# Patient Record
Sex: FEMALE | Race: WHITE | Employment: OTHER | URBAN - METROPOLITAN AREA
[De-identification: names, ages, dates, MRNs, and addresses within clinical notes are randomized per-mention and may not be internally consistent; named-entity substitution may affect disease eponyms.]

---

## 2016-07-11 LAB
ANTIBODY: NORMAL
HIV AG/AB: NORMAL

## 2017-03-20 ENCOUNTER — OFFICE VISIT (OUTPATIENT)
Dept: ORTHOPEDIC SURGERY | Age: 52
End: 2017-03-20

## 2017-03-20 VITALS — BODY MASS INDEX: 27.85 KG/M2 | WEIGHT: 163.14 LBS | HEIGHT: 64 IN

## 2017-03-20 DIAGNOSIS — M51.36 LUMBAR DEGENERATIVE DISC DISEASE: Primary | ICD-10-CM

## 2017-03-20 PROCEDURE — 99243 OFF/OP CNSLTJ NEW/EST LOW 30: CPT | Performed by: ORTHOPAEDIC SURGERY

## 2017-03-20 RX ORDER — PROMETHAZINE HYDROCHLORIDE 25 MG/1
25 TABLET ORAL
COMMUNITY
Start: 2016-10-19 | End: 2017-07-20

## 2017-03-20 RX ORDER — DIAZEPAM 5 MG/1
5 TABLET ORAL
COMMUNITY
Start: 2017-03-01 | End: 2017-07-20

## 2017-03-20 RX ORDER — TIZANIDINE 4 MG/1
2-12 TABLET ORAL
COMMUNITY
Start: 2017-03-01 | End: 2017-07-20

## 2017-03-21 ENCOUNTER — TELEPHONE (OUTPATIENT)
Dept: ORTHOPEDIC SURGERY | Age: 52
End: 2017-03-21

## 2017-04-20 ENCOUNTER — HOSPITAL ENCOUNTER (OUTPATIENT)
Dept: PAIN MANAGEMENT | Age: 52
Discharge: HOME | End: 2017-04-20
Attending: PAIN MEDICINE | Admitting: PAIN MEDICINE

## 2017-04-20 VITALS
HEIGHT: 64 IN | DIASTOLIC BLOOD PRESSURE: 79 MMHG | HEART RATE: 78 BPM | BODY MASS INDEX: 31.76 KG/M2 | TEMPERATURE: 97 F | SYSTOLIC BLOOD PRESSURE: 110 MMHG | OXYGEN SATURATION: 100 % | RESPIRATION RATE: 16 BRPM | WEIGHT: 186 LBS

## 2017-04-20 PROCEDURE — 64484 NJX AA&/STRD TFRM EPI L/S EA: CPT | Performed by: PAIN MEDICINE

## 2017-04-20 PROCEDURE — 64483 NJX AA&/STRD TFRM EPI L/S 1: CPT | Performed by: PAIN MEDICINE

## 2017-04-20 RX ORDER — GABAPENTIN 600 MG/1
600 TABLET ORAL 3 TIMES DAILY
COMMUNITY
End: 2018-08-26

## 2017-04-20 ASSESSMENT — ACTIVITIES OF DAILY LIVING (ADL): EFFECT OF PAIN ON DAILY ACTIVITIES: SLEEPING

## 2017-04-20 ASSESSMENT — PAIN DESCRIPTION - DESCRIPTORS: DESCRIPTORS: ACHING;SHARP;CONSTANT

## 2017-04-20 ASSESSMENT — PAIN - FUNCTIONAL ASSESSMENT: PAIN_FUNCTIONAL_ASSESSMENT: 0-10

## 2017-06-22 ENCOUNTER — HOSPITAL ENCOUNTER (OUTPATIENT)
Dept: PAIN MANAGEMENT | Age: 52
Discharge: HOME | End: 2017-06-22
Attending: PAIN MEDICINE | Admitting: PAIN MEDICINE

## 2017-06-22 VITALS
RESPIRATION RATE: 18 BRPM | OXYGEN SATURATION: 98 % | HEIGHT: 64 IN | DIASTOLIC BLOOD PRESSURE: 81 MMHG | HEART RATE: 80 BPM | WEIGHT: 182 LBS | TEMPERATURE: 97 F | BODY MASS INDEX: 31.07 KG/M2 | SYSTOLIC BLOOD PRESSURE: 127 MMHG

## 2017-06-22 PROCEDURE — 64494 INJ PARAVERT F JNT L/S 2 LEV: CPT | Performed by: PAIN MEDICINE

## 2017-06-22 PROCEDURE — 64493 INJ PARAVERT F JNT L/S 1 LEV: CPT | Performed by: PAIN MEDICINE

## 2017-06-22 ASSESSMENT — PAIN - FUNCTIONAL ASSESSMENT
PAIN_FUNCTIONAL_ASSESSMENT: 0-10
PAIN_FUNCTIONAL_ASSESSMENT: 0-10

## 2017-06-22 ASSESSMENT — PAIN DESCRIPTION - DESCRIPTORS: DESCRIPTORS: SHARP

## 2017-07-20 ENCOUNTER — HOSPITAL ENCOUNTER (OUTPATIENT)
Dept: SURGERY | Age: 52
Discharge: HOME | End: 2017-07-20
Attending: PAIN MEDICINE | Admitting: PAIN MEDICINE

## 2017-07-20 VITALS
SYSTOLIC BLOOD PRESSURE: 126 MMHG | TEMPERATURE: 97.2 F | DIASTOLIC BLOOD PRESSURE: 67 MMHG | OXYGEN SATURATION: 98 % | WEIGHT: 182 LBS | HEIGHT: 64 IN | HEART RATE: 85 BPM | BODY MASS INDEX: 31.07 KG/M2 | RESPIRATION RATE: 14 BRPM

## 2017-07-20 PROCEDURE — 77003 FLUOROGUIDE FOR SPINE INJECT: CPT | Performed by: PAIN MEDICINE

## 2017-07-20 PROCEDURE — 64636 DESTROY L/S FACET JNT ADDL: CPT | Performed by: PAIN MEDICINE

## 2017-07-20 PROCEDURE — 64635 DESTROY LUMB/SAC FACET JNT: CPT | Performed by: PAIN MEDICINE

## 2017-07-20 PROCEDURE — 99152 MOD SED SAME PHYS/QHP 5/>YRS: CPT | Performed by: PAIN MEDICINE

## 2017-07-20 RX ORDER — SODIUM CHLORIDE, SODIUM LACTATE, POTASSIUM CHLORIDE, CALCIUM CHLORIDE 600; 310; 30; 20 MG/100ML; MG/100ML; MG/100ML; MG/100ML
INJECTION, SOLUTION INTRAVENOUS CONTINUOUS
Status: DISCONTINUED | OUTPATIENT
Start: 2017-07-20 | End: 2017-07-21 | Stop reason: HOSPADM

## 2017-07-20 RX ORDER — LIDOCAINE HYDROCHLORIDE 10 MG/ML
INJECTION, SOLUTION EPIDURAL; INFILTRATION; INTRACAUDAL; PERINEURAL
Status: DISCONTINUED
Start: 2017-07-20 | End: 2017-07-21 | Stop reason: HOSPADM

## 2017-07-20 RX ORDER — FENTANYL CITRATE 50 UG/ML
INJECTION, SOLUTION INTRAMUSCULAR; INTRAVENOUS
Status: DISCONTINUED
Start: 2017-07-20 | End: 2017-07-21 | Stop reason: HOSPADM

## 2017-07-20 RX ORDER — MIDAZOLAM HYDROCHLORIDE 1 MG/ML
INJECTION INTRAMUSCULAR; INTRAVENOUS
Status: DISCONTINUED
Start: 2017-07-20 | End: 2017-07-21 | Stop reason: HOSPADM

## 2017-07-20 RX ORDER — SODIUM CHLORIDE, SODIUM LACTATE, POTASSIUM CHLORIDE, CALCIUM CHLORIDE 600; 310; 30; 20 MG/100ML; MG/100ML; MG/100ML; MG/100ML
INJECTION, SOLUTION INTRAVENOUS
Status: DISCONTINUED
Start: 2017-07-20 | End: 2017-07-21 | Stop reason: HOSPADM

## 2017-07-20 RX ADMIN — SODIUM CHLORIDE, SODIUM LACTATE, POTASSIUM CHLORIDE, CALCIUM CHLORIDE: 600; 310; 30; 20 INJECTION, SOLUTION INTRAVENOUS at 14:12

## 2017-07-20 ASSESSMENT — PAIN - FUNCTIONAL ASSESSMENT: PAIN_FUNCTIONAL_ASSESSMENT: 0-10

## 2018-01-25 ENCOUNTER — HOSPITAL ENCOUNTER (OUTPATIENT)
Dept: MRI IMAGING | Age: 53
Discharge: HOME | End: 2018-01-25
Admitting: FAMILY MEDICINE

## 2018-01-25 DIAGNOSIS — G44.89 HEADACHE SYNDROME: ICD-10-CM

## 2018-01-25 DIAGNOSIS — G44.89 OTHER HEADACHE SYNDROME: ICD-10-CM

## 2018-03-06 ENCOUNTER — HOSPITAL ENCOUNTER (OUTPATIENT)
Dept: CT IMAGING | Age: 53
Discharge: HOME | End: 2018-03-06
Attending: FAMILY MEDICINE | Admitting: FAMILY MEDICINE

## 2018-03-06 DIAGNOSIS — K02.9: ICD-10-CM

## 2018-03-06 DIAGNOSIS — R61 CHRONIC NIGHT SWEATS: ICD-10-CM

## 2018-03-06 DIAGNOSIS — K00.0: ICD-10-CM

## 2018-03-06 DIAGNOSIS — R61 GENERALIZED HYPERHIDROSIS: ICD-10-CM

## 2018-03-06 DIAGNOSIS — R61: ICD-10-CM

## 2018-03-06 DIAGNOSIS — R10.31 ABDOMINAL PAIN, RIGHT LOWER QUADRANT: ICD-10-CM

## 2018-08-26 ENCOUNTER — HOSPITAL ENCOUNTER (EMERGENCY)
Age: 53
Discharge: HOME | End: 2018-08-26
Attending: EMERGENCY MEDICINE
Payer: MEDICARE

## 2018-08-26 VITALS
SYSTOLIC BLOOD PRESSURE: 157 MMHG | HEART RATE: 88 BPM | BODY MASS INDEX: 33.29 KG/M2 | HEIGHT: 64 IN | RESPIRATION RATE: 14 BRPM | WEIGHT: 195 LBS | TEMPERATURE: 98.3 F | OXYGEN SATURATION: 98 % | DIASTOLIC BLOOD PRESSURE: 97 MMHG

## 2018-08-26 DIAGNOSIS — M25.511 CHRONIC RIGHT SHOULDER PAIN: Primary | ICD-10-CM

## 2018-08-26 DIAGNOSIS — G89.29 CHRONIC RIGHT SHOULDER PAIN: Primary | ICD-10-CM

## 2018-08-26 PROCEDURE — 99282 EMERGENCY DEPT VISIT SF MDM: CPT

## 2018-08-26 PROCEDURE — 6370000000 HC RX 637 (ALT 250 FOR IP): Performed by: EMERGENCY MEDICINE

## 2018-08-26 RX ORDER — OXYCODONE HYDROCHLORIDE AND ACETAMINOPHEN 5; 325 MG/1; MG/1
1 TABLET ORAL EVERY 4 HOURS PRN
Qty: 12 TABLET | Refills: 0 | Status: SHIPPED | OUTPATIENT
Start: 2018-08-26 | End: 2018-09-02

## 2018-08-26 RX ORDER — OXYCODONE HYDROCHLORIDE AND ACETAMINOPHEN 5; 325 MG/1; MG/1
1 TABLET ORAL EVERY 4 HOURS PRN
Qty: 12 TABLET | Refills: 0 | Status: SHIPPED | OUTPATIENT
Start: 2018-08-26 | End: 2018-08-26

## 2018-08-26 RX ORDER — NAPROXEN 250 MG/1
500 TABLET ORAL ONCE
Status: COMPLETED | OUTPATIENT
Start: 2018-08-26 | End: 2018-08-26

## 2018-08-26 RX ADMIN — NAPROXEN 500 MG: 250 TABLET ORAL at 15:22

## 2018-08-26 ASSESSMENT — PAIN DESCRIPTION - LOCATION
LOCATION: SHOULDER
LOCATION: SHOULDER

## 2018-08-26 ASSESSMENT — PAIN DESCRIPTION - PAIN TYPE
TYPE: ACUTE PAIN
TYPE: ACUTE PAIN

## 2018-08-26 ASSESSMENT — PAIN SCALES - GENERAL
PAINLEVEL_OUTOF10: 8
PAINLEVEL_OUTOF10: 8
PAINLEVEL_OUTOF10: 10

## 2018-08-26 NOTE — ED PROVIDER NOTES
CHIEF COMPLAINT  Shoulder Pain (pt states she has pain in r shoulder has a small tear in rotator cuff )      HISTORY OF PRESENT ILLNESS  Sandee Rutledge is a 47 y.o. female who presents to the ED complaining of right shoulder pain. She said she tore her rotator cuff 12 years ago but never had it repaired. He said the pain is getting worse now as she does not have any pain meds for a few months since she has been having trouble finding a new primary care doctor as her primary care doctor is close the practice. She has a referral to pain management and orthopedics for next week. Points at this time. No recent trauma. .   No other complaints, modifying factors or associated symptoms. Nursing notes reviewed. Past Medical History:   Diagnosis Date    Back problem     CMV (cytomegalovirus infection) status positive (HCC)     Colitis     Degeneration of lumbar or lumbosacral intervertebral disc 9/23/2016    Depression     Fibromyalgia     GERD (gastroesophageal reflux disease)     Horseshoe kidney     Hyperlipidemia     Neuropathy     Sciatica      Past Surgical History:   Procedure Laterality Date    APPENDECTOMY      CARPAL TUNNEL RELEASE      CHOLECYSTECTOMY      COLONOSCOPY  2011    COLONOSCOPY  8/29/2013    CARMEL BX    ENDOSCOPY, COLON, DIAGNOSTIC  2011    HYSTERECTOMY      complete    TUBAL LIGATION      UPPER GASTROINTESTINAL ENDOSCOPY  8/29/2013    gastritis bx done     Family History   Problem Relation Age of Onset    Heart Disease Father      Social History     Social History    Marital status:      Spouse name: N/A    Number of children: N/A    Years of education: N/A     Occupational History    Not on file.      Social History Main Topics    Smoking status: Current Every Day Smoker     Packs/day: 0.50     Years: 30.00    Smokeless tobacco: Never Used    Alcohol use No    Drug use: No    Sexual activity: Not Currently     Other Topics Concern    Not on file Social History Narrative    No narrative on file     Current Facility-Administered Medications   Medication Dose Route Frequency Provider Last Rate Last Dose    naproxen (NAPROSYN) tablet 500 mg  500 mg Oral Once Rahel Davis,          Current Outpatient Prescriptions   Medication Sig Dispense Refill    oxyCODONE-acetaminophen (PERCOCET) 5-325 MG per tablet Take 1 tablet by mouth every 4 hours as needed for Pain for up to 7 days. . 12 tablet 0    tiZANidine (ZANAFLEX) 4 MG tablet Take 4 mg by mouth every 6 hours as needed.  esomeprazole Magnesium (NEXIUM) 40 MG PACK Take 40 mg by mouth daily.  Cyanocobalamin (VITAMIN B 12 PO) Take by mouth daily       metoclopramide (REGLAN) 10 MG tablet Take 1 tablet by mouth 4 times daily for 10 days. 20 tablet 0     Allergies   Allergen Reactions    Codeine Rash    Hydrocodone-Acetaminophen Itching and Nausea And Vomiting       REVIEW OF SYSTEMS  6 systems reviewed, pertinent positives per HPI otherwise noted to be negative    PHYSICAL EXAM  BP (!) 157/97   Pulse 86   Temp 98.3 °F (36.8 °C) (Oral)   Resp 16   Ht 5' 4\" (1.626 m)   Wt 195 lb (88.5 kg)   SpO2 97%   BMI 33.47 kg/m²   GENERAL APPEARANCE: Awake and alert. Cooperative. No acute distress. HEAD: Normocephalic. Atraumatic. EYES: PERRL. EOM's grossly intact. ENT: Mucous membranes are moist.   NECK: Supple. Normal ROM. CHEST: Equal symmetric chest rise. LUNGS: Breathing is unlabored. Speaking comfortably in full sentences. EXTREMITIES: .Range of motion in the right shoulder is limited secondary to pain and cannot raise it above 90°. No  instability noted on exam.  SKIN: Warm and dry. NEUROLOGICAL: Alert and oriented. Normal coordination. Gait normal.       ED COURSE/MDM  Patient seen and evaluated. I discussed results and plan of care with patient . I do feel patient can be safely discharged to home. Recommend follow up with PCP in 2-3 days for re-evaluation.  Reasons to RT ED

## 2018-09-02 ENCOUNTER — HOSPITAL ENCOUNTER (EMERGENCY)
Age: 53
Discharge: HOME | End: 2018-09-02
Payer: MEDICARE

## 2018-09-02 VITALS
BODY MASS INDEX: 33.29 KG/M2 | SYSTOLIC BLOOD PRESSURE: 156 MMHG | TEMPERATURE: 98.6 F | WEIGHT: 195 LBS | HEART RATE: 92 BPM | HEIGHT: 64 IN | DIASTOLIC BLOOD PRESSURE: 99 MMHG | OXYGEN SATURATION: 99 % | RESPIRATION RATE: 16 BRPM

## 2018-09-02 DIAGNOSIS — S46.911D STRAIN OF RIGHT SHOULDER, SUBSEQUENT ENCOUNTER: Primary | ICD-10-CM

## 2018-09-02 PROCEDURE — 6370000000 HC RX 637 (ALT 250 FOR IP): Performed by: NURSE PRACTITIONER

## 2018-09-02 PROCEDURE — 99283 EMERGENCY DEPT VISIT LOW MDM: CPT

## 2018-09-02 RX ORDER — METHOCARBAMOL 750 MG/1
750 TABLET, FILM COATED ORAL 4 TIMES DAILY
Qty: 40 TABLET | Refills: 0 | Status: SHIPPED | OUTPATIENT
Start: 2018-09-02 | End: 2018-09-12

## 2018-09-02 RX ORDER — NAPROXEN 250 MG/1
500 TABLET ORAL ONCE
Status: COMPLETED | OUTPATIENT
Start: 2018-09-02 | End: 2018-09-02

## 2018-09-02 RX ORDER — NAPROXEN 500 MG/1
500 TABLET ORAL 2 TIMES DAILY
Qty: 30 TABLET | Refills: 0 | Status: SHIPPED | OUTPATIENT
Start: 2018-09-02 | End: 2020-01-11

## 2018-09-02 RX ADMIN — NAPROXEN 500 MG: 250 TABLET ORAL at 19:23

## 2018-09-02 ASSESSMENT — PAIN SCALES - GENERAL
PAINLEVEL_OUTOF10: 7
PAINLEVEL_OUTOF10: 7

## 2018-09-02 NOTE — ED PROVIDER NOTES
gastritis bx done     Family History   Problem Relation Age of Onset    Heart Disease Father      Social History     Social History    Marital status:      Spouse name: N/A    Number of children: N/A    Years of education: N/A     Occupational History    Not on file. Social History Main Topics    Smoking status: Current Every Day Smoker     Packs/day: 0.50     Years: 30.00    Smokeless tobacco: Never Used    Alcohol use No    Drug use: No    Sexual activity: Not Currently     Other Topics Concern    Not on file     Social History Narrative    No narrative on file     No current facility-administered medications for this encounter. Current Outpatient Prescriptions   Medication Sig Dispense Refill    naproxen (NAPROSYN) 500 MG tablet Take 1 tablet by mouth 2 times daily 30 tablet 0    methocarbamol (ROBAXIN-750) 750 MG tablet Take 1 tablet by mouth 4 times daily for 10 days 40 tablet 0    Cyanocobalamin (VITAMIN B 12 PO) Take by mouth daily       oxyCODONE-acetaminophen (PERCOCET) 5-325 MG per tablet Take 1 tablet by mouth every 4 hours as needed for Pain for up to 7 days. . 12 tablet 0    metoclopramide (REGLAN) 10 MG tablet Take 1 tablet by mouth 4 times daily for 10 days. 20 tablet 0     Allergies   Allergen Reactions    Codeine Rash    Hydrocodone-Acetaminophen Itching and Nausea And Vomiting       REVIEW OF SYSTEMS  6 systems reviewed, pertinent positives per HPI otherwise noted to be negative    PHYSICAL EXAM  BP (!) 156/99   Pulse 92   Temp 98.6 °F (37 °C) (Oral)   Resp 16   Ht 5' 4\" (1.626 m)   Wt 195 lb (88.5 kg)   SpO2 99%   BMI 33.47 kg/m²   GENERAL APPEARANCE: Awake and alert. Cooperative. No acute distress. Nontoxic in appearance. Speech is clear patient answers cautions appropriately. HEAD: Normocephalic. Atraumatic. EYES: PERRL. EOM's grossly intact. ENT: Mucous membranes are pink and moist. Nares unobstructed. TMs intact clear bilaterally.  Uvula is midline. NECK: Supple. Normal ROM. No Cervical midline bony tenderness. No step-off or crepitus. CHEST: Equal symmetric chest rise. No chest wall tenderness. LUNGS: Breathing is unlabored. Speaking comfortably in full sentences. No wheezes, rhonchi, rales or crackles. Abdomen: Nondistended and nontender. EXTREMITIES: MAEE. No acute deformities. Neurovascularly intact. Brisk cap refill. Pulses palpable +2/2 radial equal bilaterally. No cervical, thoracic, lumbar midline bony tenderness. No step-off or crepitus. No unilateral weakness. No saddle paresthesias. Equal grasps bilaterally. Decreased range of motion of right upper extremity due to pain and shoulder. Unable to raise arm above head. SKIN: Warm and dry, lesions, or ecchymosis. NEUROLOGICAL: Alert and oriented. Strength is 5/5 in all extremities and sensation is intact. No focal or motor deficits. Gait is observed and steady. RADIOLOGY  No results found. ED COURSE   I have Evaluated this patient on my own per my scope of practice. Vital signs stable. Patient received naproxen for pain, with good relief. She drove self to the emergency department for evaluation further for no other medications were administered during ED visit. A discussion was had with Mrs. Pastrana regarding ED findings and follow up with [facility] orthopedics as previously scheduled on Wednesday for possible further imaging and treatment. All questions were answered. Patient will follow up with  PCP and [facility] orthopedics for further evaluation/treatment. Patient will return to ED for new/worsening symptoms. Patient comfortable upon discharge. Return for cautions were discussed in detail with patient. Patient agreeable with plan of care, treatment, and discharge at this time. Patient was sent home with a prescription for naproxen and Robaxin.     MDM  I estimate there is LOW risk for COMPARTMENT SYNDROME, DEEP VENOUS THROMBOSIS, SEPTIC ARTHRITIS, TENDON OR

## 2018-09-02 NOTE — ED NOTES
Right arm pain. Pt states \"I am still having right arm pain I was seen here last week for this the pain is no better it 's radiating up into my neck/rght shoulder around into my back/down my arm causing my hand/fingers to feel numb/I have been taking Aleve for pain not helping\". Pt radial pulse palpable. Pt right hand dominant.      Goyo Yao LPN  51/06/99 1785

## 2018-09-05 ENCOUNTER — OFFICE VISIT (OUTPATIENT)
Dept: ORTHOPEDIC SURGERY | Age: 53
End: 2018-09-05

## 2018-09-05 VITALS — BODY MASS INDEX: 33.31 KG/M2 | WEIGHT: 195.11 LBS | HEIGHT: 64 IN

## 2018-09-05 DIAGNOSIS — M75.81 ROTATOR CUFF TENDINITIS, RIGHT: ICD-10-CM

## 2018-09-05 DIAGNOSIS — M54.12 CERVICAL RADICULITIS: ICD-10-CM

## 2018-09-05 DIAGNOSIS — M25.511 RIGHT SHOULDER PAIN, UNSPECIFIED CHRONICITY: Primary | ICD-10-CM

## 2018-09-05 DIAGNOSIS — M79.7 FIBROMYALGIA: ICD-10-CM

## 2018-09-05 PROCEDURE — G8417 CALC BMI ABV UP PARAM F/U: HCPCS | Performed by: ORTHOPAEDIC SURGERY

## 2018-09-05 PROCEDURE — G8427 DOCREV CUR MEDS BY ELIG CLIN: HCPCS | Performed by: ORTHOPAEDIC SURGERY

## 2018-09-05 PROCEDURE — 4004F PT TOBACCO SCREEN RCVD TLK: CPT | Performed by: ORTHOPAEDIC SURGERY

## 2018-09-05 PROCEDURE — 20611 DRAIN/INJ JOINT/BURSA W/US: CPT | Performed by: ORTHOPAEDIC SURGERY

## 2018-09-05 PROCEDURE — 99203 OFFICE O/P NEW LOW 30 MIN: CPT | Performed by: ORTHOPAEDIC SURGERY

## 2018-09-05 PROCEDURE — 3017F COLORECTAL CA SCREEN DOC REV: CPT | Performed by: ORTHOPAEDIC SURGERY

## 2018-09-05 NOTE — PROGRESS NOTES
4CC BUPIVACAINE  NDC#-[id number]  LOT#- [id number]  EXP:2/2019    4CC XYLOCAINE  NDC#-[id number]  LOT#- [id number]  EXP: 9/2021    2CC DEPO  NDC#-[id number]  LOT#- [id number]  EXP: 1/2020    SITE: RIGHT SHOULDER
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
## 2019-02-19 LAB
ALBUMIN SERPL-MCNC: NORMAL G/DL
ALP BLD-CCNC: 76 U/L
ALT SERPL-CCNC: 24 U/L
ANION GAP SERPL CALCULATED.3IONS-SCNC: 4 MMOL/L
AST SERPL-CCNC: 22 U/L
BILIRUB SERPL-MCNC: 0.3 MG/DL (ref 0.1–1.4)
BUN BLDV-MCNC: 17 MG/DL
CALCIUM SERPL-MCNC: 1.4 MG/DL
CHLORIDE BLD-SCNC: 104 MMOL/L
CHOLESTEROL, TOTAL: 240 MG/DL
CHOLESTEROL/HDL RATIO: NORMAL
CO2: 30 MMOL/L
CREAT SERPL-MCNC: 0.63 MG/DL
GFR CALCULATED: >90
GLUCOSE BLD-MCNC: 92 MG/DL
HDLC SERPL-MCNC: 62 MG/DL (ref 35–70)
LDL CHOLESTEROL CALCULATED: 149 MG/DL (ref 0–160)
POTASSIUM SERPL-SCNC: 4.4 MMOL/L
SODIUM BLD-SCNC: 138 MMOL/L
TOTAL PROTEIN: 7.1
TRIGL SERPL-MCNC: 146 MG/DL
TSH SERPL DL<=0.05 MIU/L-ACNC: 1.37 UIU/ML
VLDLC SERPL CALC-MCNC: NORMAL MG/DL

## 2019-04-04 ENCOUNTER — HOSPITAL ENCOUNTER (OUTPATIENT)
Dept: MRI IMAGING | Age: 54
Discharge: HOME | End: 2019-04-04
Payer: COMMERCIAL

## 2019-04-04 DIAGNOSIS — M54.12 CERVICAL RADICULOPATHY: ICD-10-CM

## 2019-04-04 PROCEDURE — 72141 MRI NECK SPINE W/O DYE: CPT

## 2019-04-27 ENCOUNTER — HOSPITAL ENCOUNTER (EMERGENCY)
Age: 54
Discharge: HOME | End: 2019-04-27
Payer: OTHER MISCELLANEOUS

## 2019-04-27 ENCOUNTER — APPOINTMENT (OUTPATIENT)
Dept: CT IMAGING | Age: 54
End: 2019-04-27
Payer: OTHER MISCELLANEOUS

## 2019-04-27 VITALS
HEART RATE: 101 BPM | HEIGHT: 64 IN | SYSTOLIC BLOOD PRESSURE: 138 MMHG | DIASTOLIC BLOOD PRESSURE: 88 MMHG | WEIGHT: 187 LBS | TEMPERATURE: 98.1 F | RESPIRATION RATE: 20 BRPM | BODY MASS INDEX: 31.92 KG/M2 | OXYGEN SATURATION: 96 %

## 2019-04-27 DIAGNOSIS — S16.1XXA ACUTE STRAIN OF NECK MUSCLE, INITIAL ENCOUNTER: Primary | ICD-10-CM
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
## 2019-04-27 PROCEDURE — 72125 CT NECK SPINE W/O DYE: CPT

## 2019-04-27 PROCEDURE — 6370000000 HC RX 637 (ALT 250 FOR IP): Performed by: PHYSICIAN ASSISTANT

## 2019-04-27 PROCEDURE — 99284 EMERGENCY DEPT VISIT MOD MDM: CPT

## 2019-04-27 PROCEDURE — 6360000002 HC RX W HCPCS: Performed by: PHYSICIAN ASSISTANT

## 2019-04-27 PROCEDURE — 96372 THER/PROPH/DIAG INJ SC/IM: CPT

## 2019-04-27 RX ORDER — OXYCODONE HYDROCHLORIDE AND ACETAMINOPHEN 5; 325 MG/1; MG/1
1 TABLET ORAL EVERY 8 HOURS PRN
Qty: 10 TABLET | Refills: 0 | Status: SHIPPED | OUTPATIENT
Start: 2019-04-27 | End: 2019-04-30

## 2019-04-27 RX ORDER — KETOROLAC TROMETHAMINE 30 MG/ML
60 INJECTION, SOLUTION INTRAMUSCULAR; INTRAVENOUS ONCE
Status: COMPLETED | OUTPATIENT
Start: 2019-04-27 | End: 2019-04-27

## 2019-04-27 RX ORDER — OXYCODONE HYDROCHLORIDE AND ACETAMINOPHEN 5; 325 MG/1; MG/1
1 TABLET ORAL ONCE
Status: COMPLETED | OUTPATIENT
Start: 2019-04-27 | End: 2019-04-27

## 2019-04-27 RX ORDER — PREDNISONE 10 MG/1
40 TABLET ORAL DAILY
Qty: 20 TABLET | Refills: 0 | Status: SHIPPED | OUTPATIENT
Start: 2019-04-27 | End: 2019-05-02

## 2019-04-27 RX ORDER — ORPHENADRINE CITRATE 30 MG/ML
60 INJECTION INTRAMUSCULAR; INTRAVENOUS ONCE
Status: COMPLETED | OUTPATIENT
Start: 2019-04-27 | End: 2019-04-27

## 2019-04-27 RX ADMIN — ORPHENADRINE CITRATE 60 MG: 60 INJECTION INTRAMUSCULAR; INTRAVENOUS at 14:14

## 2019-04-27 RX ADMIN — OXYCODONE HYDROCHLORIDE AND ACETAMINOPHEN 1 TABLET: 5; 325 TABLET ORAL at 14:14

## 2019-04-27 RX ADMIN — KETOROLAC TROMETHAMINE 60 MG: 30 INJECTION, SOLUTION INTRAMUSCULAR at 14:14

## 2019-04-27 ASSESSMENT — PAIN DESCRIPTION - PAIN TYPE: TYPE: ACUTE PAIN

## 2019-04-27 ASSESSMENT — PAIN DESCRIPTION - LOCATION: LOCATION: NECK;BACK

## 2019-04-27 ASSESSMENT — PAIN SCALES - WONG BAKER: WONGBAKER_NUMERICALRESPONSE: 6

## 2019-04-27 ASSESSMENT — PAIN SCALES - GENERAL
PAINLEVEL_OUTOF10: 6
PAINLEVEL_OUTOF10: 6
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
## 2019-06-05 ENCOUNTER — HOSPITAL ENCOUNTER (OUTPATIENT)
Dept: MRI IMAGING | Age: 54
Discharge: HOME | End: 2019-06-05
Payer: COMMERCIAL

## 2019-06-05 DIAGNOSIS — M50.20 DISPLACEMENT OF CERVICAL INTERVERTEBRAL DISC WITHOUT MYELOPATHY: ICD-10-CM

## 2019-06-05 PROCEDURE — 72141 MRI NECK SPINE W/O DYE: CPT

## 2019-06-06 ENCOUNTER — HOSPITAL ENCOUNTER (OUTPATIENT)
Age: 54
Setting detail: OUTPATIENT SURGERY
Discharge: HOME | End: 2019-06-06
Attending: PAIN MEDICINE | Admitting: PAIN MEDICINE
Payer: COMMERCIAL

## 2019-06-06 VITALS
RESPIRATION RATE: 16 BRPM | DIASTOLIC BLOOD PRESSURE: 94 MMHG | HEART RATE: 96 BPM | SYSTOLIC BLOOD PRESSURE: 144 MMHG | WEIGHT: 195 LBS | HEIGHT: 64 IN | BODY MASS INDEX: 33.29 KG/M2 | OXYGEN SATURATION: 98 % | TEMPERATURE: 97.1 F

## 2019-06-06 PROCEDURE — 3600000002 HC SURGERY LEVEL 2 BASE: Performed by: PAIN MEDICINE

## 2019-06-06 PROCEDURE — 2500000003 HC RX 250 WO HCPCS: Performed by: PAIN MEDICINE

## 2019-06-06 PROCEDURE — 6360000004 HC RX CONTRAST MEDICATION: Performed by: PAIN MEDICINE

## 2019-06-06 PROCEDURE — 64491 INJ PARAVERT F JNT C/T 2 LEV: CPT | Performed by: PAIN MEDICINE

## 2019-06-06 PROCEDURE — 64490 INJ PARAVERT F JNT C/T 1 LEV: CPT | Performed by: PAIN MEDICINE

## 2019-06-06 PROCEDURE — 3600000012 HC SURGERY LEVEL 2 ADDTL 15MIN: Performed by: PAIN MEDICINE

## 2019-06-06 PROCEDURE — 2709999900 HC NON-CHARGEABLE SUPPLY: Performed by: PAIN MEDICINE

## 2019-06-06 PROCEDURE — 7100000010 HC PHASE II RECOVERY - FIRST 15 MIN: Performed by: PAIN MEDICINE

## 2019-06-06 PROCEDURE — 6360000002 HC RX W HCPCS: Performed by: PAIN MEDICINE

## 2019-06-06 RX ORDER — LIDOCAINE HYDROCHLORIDE 10 MG/ML
INJECTION, SOLUTION EPIDURAL; INFILTRATION; INTRACAUDAL; PERINEURAL PRN
Status: DISCONTINUED | OUTPATIENT
Start: 2019-06-06 | End: 2019-06-06 | Stop reason: ALTCHOICE

## 2019-06-06 RX ORDER — CYCLOBENZAPRINE HCL 10 MG
10 TABLET ORAL 3 TIMES DAILY PRN
COMMUNITY
End: 2020-01-11

## 2019-06-06 ASSESSMENT — PAIN SCALES - GENERAL: PAINLEVEL_OUTOF10: 0

## 2019-06-06 ASSESSMENT — PAIN - FUNCTIONAL ASSESSMENT
PAIN_FUNCTIONAL_ASSESSMENT: 0-10
PAIN_FUNCTIONAL_ASSESSMENT: PREVENTS OR INTERFERES SOME ACTIVE ACTIVITIES AND ADLS

## 2019-06-06 NOTE — PROGRESS NOTES
Discharge instructions given to pt and verbalized understanding, states pain is at a tolerable level, no numbness or weakness noted,vitals stable, pt ambulated out to car without complications, daughter driving home

## 2019-07-18 ENCOUNTER — HOSPITAL ENCOUNTER (OUTPATIENT)
Age: 54
Setting detail: OUTPATIENT SURGERY
Discharge: HOME | End: 2019-07-18
Attending: PAIN MEDICINE | Admitting: PAIN MEDICINE
Payer: COMMERCIAL

## 2019-07-18 VITALS
BODY MASS INDEX: 32.27 KG/M2 | DIASTOLIC BLOOD PRESSURE: 100 MMHG | OXYGEN SATURATION: 99 % | WEIGHT: 189 LBS | HEIGHT: 64 IN | RESPIRATION RATE: 16 BRPM | HEART RATE: 80 BPM | TEMPERATURE: 96.9 F | SYSTOLIC BLOOD PRESSURE: 134 MMHG

## 2019-07-18 PROCEDURE — 7100000011 HC PHASE II RECOVERY - ADDTL 15 MIN: Performed by: PAIN MEDICINE

## 2019-07-18 PROCEDURE — 7100000010 HC PHASE II RECOVERY - FIRST 15 MIN: Performed by: PAIN MEDICINE

## 2019-07-18 PROCEDURE — 64490 INJ PARAVERT F JNT C/T 1 LEV: CPT | Performed by: PAIN MEDICINE

## 2019-07-18 PROCEDURE — 64491 INJ PARAVERT F JNT C/T 2 LEV: CPT | Performed by: PAIN MEDICINE

## 2019-07-18 PROCEDURE — 2709999900 HC NON-CHARGEABLE SUPPLY: Performed by: PAIN MEDICINE

## 2019-07-18 PROCEDURE — 2500000003 HC RX 250 WO HCPCS: Performed by: PAIN MEDICINE

## 2019-07-18 PROCEDURE — 3600000002 HC SURGERY LEVEL 2 BASE: Performed by: PAIN MEDICINE

## 2019-07-18 RX ORDER — BUPIVACAINE HYDROCHLORIDE 7.5 MG/ML
INJECTION, SOLUTION EPIDURAL; RETROBULBAR PRN
Status: DISCONTINUED | OUTPATIENT
Start: 2019-07-18 | End: 2019-07-18 | Stop reason: ALTCHOICE

## 2019-07-18 ASSESSMENT — PAIN DESCRIPTION - PROGRESSION: CLINICAL_PROGRESSION: GRADUALLY WORSENING

## 2019-07-18 ASSESSMENT — PAIN DESCRIPTION - ORIENTATION: ORIENTATION: RIGHT

## 2019-07-18 ASSESSMENT — PAIN DESCRIPTION - ONSET: ONSET: ON-GOING

## 2019-07-18 ASSESSMENT — PAIN DESCRIPTION - PAIN TYPE: TYPE: CHRONIC PAIN

## 2019-07-18 ASSESSMENT — PAIN DESCRIPTION - LOCATION: LOCATION: BACK

## 2019-07-18 ASSESSMENT — PAIN - FUNCTIONAL ASSESSMENT
PAIN_FUNCTIONAL_ASSESSMENT: PREVENTS OR INTERFERES SOME ACTIVE ACTIVITIES AND ADLS
PAIN_FUNCTIONAL_ASSESSMENT: 0-10

## 2019-07-18 ASSESSMENT — PAIN DESCRIPTION - DESCRIPTORS
DESCRIPTORS: SHARP;PRESSURE;ACHING
DESCRIPTORS: SHARP;ACHING

## 2019-07-18 ASSESSMENT — PAIN DESCRIPTION - FREQUENCY: FREQUENCY: CONTINUOUS

## 2019-07-18 ASSESSMENT — PAIN SCALES - GENERAL: PAINLEVEL_OUTOF10: 8

## 2019-08-29 ENCOUNTER — HOSPITAL ENCOUNTER (OUTPATIENT)
Age: 54
Setting detail: OUTPATIENT SURGERY
Discharge: HOME | End: 2019-08-29
Attending: PAIN MEDICINE | Admitting: PAIN MEDICINE
Payer: COMMERCIAL

## 2019-08-29 VITALS
HEART RATE: 92 BPM | TEMPERATURE: 97.3 F | RESPIRATION RATE: 16 BRPM | SYSTOLIC BLOOD PRESSURE: 144 MMHG | DIASTOLIC BLOOD PRESSURE: 84 MMHG | BODY MASS INDEX: 32.78 KG/M2 | WEIGHT: 192 LBS | HEIGHT: 64 IN | OXYGEN SATURATION: 97 %

## 2019-08-29 PROCEDURE — 64634 DESTROY C/TH FACET JNT ADDL: CPT | Performed by: PAIN MEDICINE

## 2019-08-29 PROCEDURE — 2580000003 HC RX 258: Performed by: PAIN MEDICINE

## 2019-08-29 PROCEDURE — 99152 MOD SED SAME PHYS/QHP 5/>YRS: CPT | Performed by: PAIN MEDICINE

## 2019-08-29 PROCEDURE — 6360000002 HC RX W HCPCS: Performed by: PAIN MEDICINE

## 2019-08-29 PROCEDURE — 2709999900 HC NON-CHARGEABLE SUPPLY: Performed by: PAIN MEDICINE

## 2019-08-29 PROCEDURE — 3600000012 HC SURGERY LEVEL 2 ADDTL 15MIN: Performed by: PAIN MEDICINE

## 2019-08-29 PROCEDURE — 7100000011 HC PHASE II RECOVERY - ADDTL 15 MIN: Performed by: PAIN MEDICINE

## 2019-08-29 PROCEDURE — 77003 FLUOROGUIDE FOR SPINE INJECT: CPT | Performed by: PAIN MEDICINE

## 2019-08-29 PROCEDURE — 7100000010 HC PHASE II RECOVERY - FIRST 15 MIN: Performed by: PAIN MEDICINE

## 2019-08-29 PROCEDURE — 2500000003 HC RX 250 WO HCPCS: Performed by: PAIN MEDICINE

## 2019-08-29 PROCEDURE — 64633 DESTROY CERV/THOR FACET JNT: CPT | Performed by: PAIN MEDICINE

## 2019-08-29 PROCEDURE — 3600000002 HC SURGERY LEVEL 2 BASE: Performed by: PAIN MEDICINE

## 2019-08-29 RX ORDER — SODIUM CHLORIDE, SODIUM LACTATE, POTASSIUM CHLORIDE, CALCIUM CHLORIDE 600; 310; 30; 20 MG/100ML; MG/100ML; MG/100ML; MG/100ML
INJECTION, SOLUTION INTRAVENOUS CONTINUOUS
Status: DISCONTINUED | OUTPATIENT
Start: 2019-08-29 | End: 2019-08-29 | Stop reason: HOSPADM

## 2019-08-29 RX ORDER — FENTANYL CITRATE 50 UG/ML
INJECTION, SOLUTION INTRAMUSCULAR; INTRAVENOUS PRN
Status: DISCONTINUED | OUTPATIENT
Start: 2019-08-29 | End: 2019-08-29 | Stop reason: ALTCHOICE

## 2019-08-29 RX ORDER — MIDAZOLAM HYDROCHLORIDE 5 MG/ML
INJECTION INTRAMUSCULAR; INTRAVENOUS PRN
Status: DISCONTINUED | OUTPATIENT
Start: 2019-08-29 | End: 2019-08-29 | Stop reason: ALTCHOICE

## 2019-08-29 RX ORDER — LIDOCAINE HYDROCHLORIDE 20 MG/ML
INJECTION, SOLUTION EPIDURAL; INFILTRATION; INTRACAUDAL; PERINEURAL PRN
Status: DISCONTINUED | OUTPATIENT
Start: 2019-08-29 | End: 2019-08-29 | Stop reason: ALTCHOICE

## 2019-08-29 RX ADMIN — LIDOCAINE HYDROCHLORIDE 0.1 ML: 10 INJECTION, SOLUTION EPIDURAL; INFILTRATION; INTRACAUDAL; PERINEURAL at 14:22

## 2019-08-29 RX ADMIN — SODIUM CHLORIDE, POTASSIUM CHLORIDE, SODIUM LACTATE AND CALCIUM CHLORIDE: 600; 310; 30; 20 INJECTION, SOLUTION INTRAVENOUS at 14:22

## 2019-08-29 ASSESSMENT — PAIN DESCRIPTION - DESCRIPTORS: DESCRIPTORS: SHARP;TIGHTNESS;PRESSURE

## 2019-08-29 NOTE — PROCEDURES
was injected and radiofrequency lesioning was done for 90 seconds at 80 degree centigrade. Needle pulled out intact. Exact similar procedure was performed at _R C4,C5,C6__ levels. Paraspinal muscle twitching was seen at _C4,C5__ levels. Patient did not feel radicular pain either during needle placement or during lesioning. Patient tolerated the procedure well. Intravenous sedation was / was not used. 100__ mcg of fentanyl and __2 mg of Versed was used.  Patient was monitored and stable  ESTIMATED BLOOD LOSS IS 0.5 CC OR LESS

## 2019-10-06 ENCOUNTER — HOSPITAL ENCOUNTER (EMERGENCY)
Age: 54
Discharge: HOME | End: 2019-10-06
Payer: COMMERCIAL

## 2019-10-06 VITALS
DIASTOLIC BLOOD PRESSURE: 95 MMHG | HEIGHT: 64 IN | BODY MASS INDEX: 32.78 KG/M2 | SYSTOLIC BLOOD PRESSURE: 165 MMHG | WEIGHT: 192 LBS | TEMPERATURE: 97.9 F | HEART RATE: 91 BPM | OXYGEN SATURATION: 98 % | RESPIRATION RATE: 18 BRPM

## 2019-10-06 DIAGNOSIS — G89.29 CHRONIC NECK PAIN: ICD-10-CM

## 2019-10-06 DIAGNOSIS — M54.2 CHRONIC NECK PAIN: ICD-10-CM

## 2019-10-06 DIAGNOSIS — M54.2 ACUTE NECK PAIN: Primary | ICD-10-CM

## 2019-10-06 PROCEDURE — 96372 THER/PROPH/DIAG INJ SC/IM: CPT

## 2019-10-06 PROCEDURE — 6360000002 HC RX W HCPCS: Performed by: PHYSICIAN ASSISTANT

## 2019-10-06 PROCEDURE — 99283 EMERGENCY DEPT VISIT LOW MDM: CPT

## 2019-10-06 PROCEDURE — 6370000000 HC RX 637 (ALT 250 FOR IP): Performed by: PHYSICIAN ASSISTANT

## 2019-10-06 RX ORDER — KETOROLAC TROMETHAMINE 30 MG/ML
30 INJECTION, SOLUTION INTRAMUSCULAR; INTRAVENOUS ONCE
Status: COMPLETED | OUTPATIENT
Start: 2019-10-06 | End: 2019-10-06

## 2019-10-06 RX ORDER — LIDOCAINE 50 MG/G
1 PATCH TOPICAL DAILY
Qty: 30 PATCH | Refills: 0 | Status: SHIPPED | OUTPATIENT
Start: 2019-10-06 | End: 2020-01-11

## 2019-10-06 RX ORDER — LIDOCAINE 4 G/G
1 PATCH TOPICAL DAILY
Status: DISCONTINUED | OUTPATIENT
Start: 2019-10-06 | End: 2019-10-06 | Stop reason: HOSPADM

## 2019-10-06 RX ADMIN — KETOROLAC TROMETHAMINE 30 MG: 30 INJECTION, SOLUTION INTRAMUSCULAR at 12:37

## 2019-10-06 ASSESSMENT — PAIN DESCRIPTION - PAIN TYPE: TYPE: ACUTE PAIN

## 2019-10-06 ASSESSMENT — PAIN SCALES - GENERAL
PAINLEVEL_OUTOF10: 8
PAINLEVEL_OUTOF10: 10

## 2019-10-06 ASSESSMENT — ENCOUNTER SYMPTOMS
BACK PAIN: 0
ABDOMINAL PAIN: 0
COLOR CHANGE: 0
SHORTNESS OF BREATH: 0

## 2019-11-29 ENCOUNTER — HOSPITAL ENCOUNTER (OUTPATIENT)
Dept: MRI IMAGING | Age: 54
Discharge: HOME | End: 2019-11-29
Payer: COMMERCIAL

## 2019-11-29 DIAGNOSIS — M50.20 DISPLACEMENT OF CERVICAL INTERVERTEBRAL DISC WITHOUT MYELOPATHY: ICD-10-CM

## 2019-11-29 PROCEDURE — 72141 MRI NECK SPINE W/O DYE: CPT

## 2020-01-02 ENCOUNTER — HOSPITAL ENCOUNTER (OUTPATIENT)
Dept: PHYSICAL THERAPY | Age: 55
Setting detail: THERAPIES SERIES
Discharge: HOME | End: 2020-01-02
Payer: COMMERCIAL

## 2020-01-02 PROCEDURE — 97112 NEUROMUSCULAR REEDUCATION: CPT

## 2020-01-02 PROCEDURE — 97161 PT EVAL LOW COMPLEX 20 MIN: CPT

## 2020-01-02 ASSESSMENT — PAIN DESCRIPTION - PROGRESSION: CLINICAL_PROGRESSION: GRADUALLY WORSENING

## 2020-01-02 ASSESSMENT — PAIN SCALES - GENERAL: PAINLEVEL_OUTOF10: 8

## 2020-01-02 ASSESSMENT — PAIN DESCRIPTION - ONSET: ONSET: GRADUAL

## 2020-01-02 ASSESSMENT — PAIN DESCRIPTION - PAIN TYPE: TYPE: CHRONIC PAIN

## 2020-01-02 ASSESSMENT — PAIN DESCRIPTION - FREQUENCY: FREQUENCY: CONTINUOUS

## 2020-01-02 ASSESSMENT — PAIN DESCRIPTION - LOCATION: LOCATION: NECK;SHOULDER

## 2020-01-02 ASSESSMENT — PAIN DESCRIPTION - ORIENTATION: ORIENTATION: RIGHT

## 2020-01-03 NOTE — PROGRESS NOTES
Physical Therapy  Initial Assessment  Date: 2020  Patient Name: Levi Palomares  MRN: [id number]  : 1965          Restrictions   none per rx    Subjective   General  Chart Reviewed: Yes  Additional Pertinent Hx: chronic fibromyalgia, sciatica, HLD, Neuropathy, GERD, Depression, carpal tunnel release  Family / Caregiver Present: No  Referring Practitioner: Shravan Schneider CNP  Referral Date : 20  Diagnosis: cervical disc disease C6-7 with R radiculopathy  Follows Commands: Within Functional Limits  General Comment  Comments: NDI 37/50 = 74% disability   PT Visit Information  Onset Date: 20  PT Insurance Information: [place] Dual - 30 visits then pre-cert  Subjective  Subjective: Pt reports that her pain has been going on for about 2 years. Initially thought it was her shoulder, but Shad determined it as a pinched nerve in her neck. Received epidurals from Dr. Tyler Westbrook throughout the year without much relief. Had radiofrequency ablation without relief. Saw neurosurgery at [facility] Dec 4th and likely getting epidural through them but they haven't been approved yet. Had an EMG on Tuesday - has not received the results yet. Feels like she is being stabbed across her upper trap constant \"I call it thumper\". Pain is in shoulder but radiates down into her hand. Sometimes it goes to thumb/pointer finger, other times in medial elbow and down to pinky. Constant pain - cannot find anything to help relieve the pain. Uses ibuprofen and muscle rubs without relief. Pain will go down around the shoulder blade too. Unable to sleep due to the pain. Primary caregiver for 3 y/o granddaughter. Pt reports that she has not had any PT. Daily headaches across back of her head and into R jaw.    Pain Screening  Patient Currently in Pain: Yes       Social/Functional History  Social/Functional History  ADL Assistance: Independent(difficulty with washing hair, pulling it back, donning/doffing shirt)  Homemaking Assistance: Independent(vacuuming is \"impossible\")  Meal Prep Responsibility: Primary  Laundry Responsibility: Primary  Cleaning Responsibility: Primary  Bill Paying/Finance Responsibility: Primary  Shopping Responsibility: Primary  Dependent Care Responsibility: Primary  Ambulation Assistance: Independent  Transfer Assistance: Independent  Active : Yes(\"driving isn't pleasant\")  Mode of Transportation: Car  Occupation: On disability(for a \"bunch of physical issues\")  Leisure & Hobbies: PLOF - no regular exercise program due to back, feet, knee issues \"and this neck stuff is just topping it all off\"    Objective     Observation/Palpation  Posture: Poor  Palpation: hypersensitive to palpation throughout R cervical paraspinals, B suboccipitals, R upper trap/levator scap/subscap - tolerating minimal pressure   Observation: forward head posture, elevated shoulders  Body Mechanics: R AGMR with 4 finger width in supine from R shoulder to plinth; aberrant scapular motion pattern on R with elevation     PROM RUE (degrees)  RUE General PROM: pt unable to relax to allow for accurate assessment of passive ROM  AROM RUE (degrees)  RUE AROM : Exceptions  R Shoulder Flexion 0-180: 95 degrees with pain  R Shoulder ABduction 0-180: 95 degrees with pain  R Shoulder Int Rotation  0-70: to side of ilium (L WFL)  R Shoulder Ext Rotation 0-90: to side of occiput (L WFL)  AROM LUE (degrees)  LUE AROM : WFL  Spine  Cervical: rotation R decreased 50% with pain, rotation L decreased 25% with mild pain, side bend L increased pain and decreased 75%, side bend R with stretching with decreased motion 75%, extension decreased 50%, flexion decreased 50% (\"feels like it's tearing\")   Special Tests: (-) CN screen, (-) cervical instability testing     Strength RUE  Strength RUE: Exception  R Shoulder Flexion: 3-/5(limited due to ROM)  R Shoulder ABduction: 3-/5(limited due to ROM)  R Shoulder Internal Rotation: 5/5  R Shoulder External active ROM  Long term goal 4: Pt will demonstrate improved posture 50% of the time without cueing in clinic  Long term goal 5: Pt will improve NDI by 7 points or more to indicate significant change. Therapy Time   Individual Concurrent Group Co-treatment   Time In 1000         Time Out 1100         Minutes 60         Timed Code Treatment Minutes: 10 Minutes        Adebayo Sullivan PT , DPT [id number]      If you have any questions or concerns, please don't hesitate to call.   Thank you for your referral.    Physician Signature:________________________________Date:__________________  By signing above, therapists plan is approved by physician    Please return by fax to [phone]

## 2020-01-06 ENCOUNTER — HOSPITAL ENCOUNTER (OUTPATIENT)
Dept: PHYSICAL THERAPY | Age: 55
Setting detail: THERAPIES SERIES
Discharge: HOME | End: 2020-01-06
Payer: COMMERCIAL

## 2020-01-07 ENCOUNTER — HOSPITAL ENCOUNTER (OUTPATIENT)
Dept: PHYSICAL THERAPY | Age: 55
Setting detail: THERAPIES SERIES
Discharge: HOME | End: 2020-01-07
Payer: COMMERCIAL

## 2020-01-07 PROCEDURE — 97110 THERAPEUTIC EXERCISES: CPT

## 2020-01-07 PROCEDURE — 97140 MANUAL THERAPY 1/> REGIONS: CPT

## 2020-01-09 ENCOUNTER — HOSPITAL ENCOUNTER (OUTPATIENT)
Dept: PHYSICAL THERAPY | Age: 55
Setting detail: THERAPIES SERIES
Discharge: HOME | End: 2020-01-09
Payer: COMMERCIAL

## 2020-01-09 PROCEDURE — 97140 MANUAL THERAPY 1/> REGIONS: CPT

## 2020-01-09 NOTE — FLOWSHEET NOTE
Physical Therapy Daily Treatment Note    Date:  2020    Patient Name:  Gucci Parsons    :  1965  MRN: [id number]  Restrictions/Precautions:    Medical/Treatment Diagnosis Information:  Diagnosis: cervical disc disease C6-7 with R radiculopathy  Insurance/Certification information:  PT Insurance Information: [employer] - 30 visits then pre-cert  Physician Information:  Referring Practitioner: Marvin Lopez CNP  Plan of care signed (Y/N):  N  Visit# / total visits:  3/8     G-Code (if applicable):          NDI 37/50    Medicare Cap (if applicable):  n/a = total amount used, updated 2020    Time in:  9:30      Timed Treatment: 30 Total Treatment Time:  35  Time out: 10:05  ________________________________________________________________________________________    Pain Level:   8/10 neck pain, 9/10 back pain   SUBJECTIVE:  Pt reports that she is feeling the exercises more on the L side than the R side. Having a lot of back pain today. OBJECTIVE:     Exercise/Equipment Resistance/Repetitions Other comments          SNF neuro re-ed   x3 minutes against PT resistance  x2 minute against self resistance  With cervical rotation 10x B     HEP    HEP   scap squeeze 10x5\" HEP                                                                                                                        Other Therapeutic Activities:      Manual Treatments:         Suboccipital release  Gentle manual tractions  Gentle side glide oscillations throughout c-spine B gr I-II  CTJ harmonics  1st rib oscillations on R     Total manual 25 minutes     Modalities:  MHP to cspine x5 minutes prior to session     Test/Measurements:  See initial eval       ASSESSMENT:    Pt tolerated gentle manual treatment well with exception of R upper trap (pt stated that it was a fibro trigger point and requested PT to stay away from that point).  Pt remains very guarded to movement and palpation, and did not report significant change in pain levels and/or motion post treatment. Progress as pt tolerates. Treatment/Activity Tolerance:   []Patient tolerated treatment well [] Patient limited by fatique  [x]Patient limited by pain [] Patient limited by other medical complications  [] Other:     Goals:          Long term goals  Time Frame for Long term goals : 4 weeks  Long term goal 1: Pt will be independent with HEP  Long term goal 2: Pt will report 50% improvement in frequency and intensity of pain  Long term goal 3: Pt will demonstrate WFL cervical and R shoulder active ROM  Long term goal 4: Pt will demonstrate improved posture 50% of the time without cueing in clinic  Long term goal 5: Pt will improve NDI by 7 points or more to indicate significant change.       Plan: [x] Continue per plan of care [] Alter current plan (see comments)   [] Plan of care initiated [] Hold pending MD visit [] Discharge      Plan for Next Session:      Re-Certification Due Date:         Signature:  Javed Miller , PT

## 2020-01-11 ENCOUNTER — HOSPITAL ENCOUNTER (EMERGENCY)
Age: 55
Discharge: HOME | End: 2020-01-11
Payer: COMMERCIAL

## 2020-01-11 VITALS
WEIGHT: 195 LBS | DIASTOLIC BLOOD PRESSURE: 105 MMHG | RESPIRATION RATE: 18 BRPM | BODY MASS INDEX: 33.29 KG/M2 | HEART RATE: 86 BPM | SYSTOLIC BLOOD PRESSURE: 139 MMHG | OXYGEN SATURATION: 98 % | TEMPERATURE: 98.6 F | HEIGHT: 64 IN

## 2020-01-11 PROCEDURE — 99283 EMERGENCY DEPT VISIT LOW MDM: CPT

## 2020-01-11 RX ORDER — PREDNISONE 20 MG/1
60 TABLET ORAL DAILY
Qty: 15 TABLET | Refills: 0 | Status: SHIPPED | OUTPATIENT
Start: 2020-01-11 | End: 2020-01-16

## 2020-01-11 RX ORDER — CYCLOBENZAPRINE HCL 10 MG
10 TABLET ORAL NIGHTLY PRN
Qty: 10 TABLET | Refills: 0 | Status: SHIPPED | OUTPATIENT
Start: 2020-01-11 | End: 2020-01-21

## 2020-01-11 RX ORDER — OXYCODONE HYDROCHLORIDE AND ACETAMINOPHEN 5; 325 MG/1; MG/1
1 TABLET ORAL EVERY 6 HOURS PRN
Qty: 12 TABLET | Refills: 0 | Status: SHIPPED | OUTPATIENT
Start: 2020-01-11 | End: 2020-01-14

## 2020-01-11 ASSESSMENT — PAIN SCALES - GENERAL
PAINLEVEL_OUTOF10: 8
PAINLEVEL_OUTOF10: 8

## 2020-01-11 ASSESSMENT — PAIN DESCRIPTION - LOCATION: LOCATION: BACK

## 2020-01-11 ASSESSMENT — PAIN DESCRIPTION - DESCRIPTORS: DESCRIPTORS: ACHING

## 2020-01-11 NOTE — ED PROVIDER NOTES
[address] [facility] Emergency Department    CHIEF COMPLAINT  Back Pain (hx of chronic back pain)      HISTORY OF PRESENT ILLNESS  Clifford De La Torre is a 53 y.o. female who presents to the ED complaining of several day history of increased low back pain. Patient observed lying in bed, appears nontoxic and in no acute distress at this time. She drove herself in today for evaluation. Patient with history of chronic neck and back pain. Currently being followed by Christine and is in physical therapy. Denies recent trauma or injury which she can recall. States that right low back pain has worsened. Reports pain shooting down right leg described as burning. Worse with movement. Is ambulatory. Feels better to stand. Currently 8 out of 10. Denies loss of bowel or bladder function. No saddle anesthesia. No numbness, tingling, weakness of extremities. Denies any urinary symptoms. No fevers chills. No history of IV drug abuse. Has received epidural injections although reports that it has been months since her last.    No other complaints, modifying factors or associated symptoms. Nursing notes reviewed.    Past Medical History:   Diagnosis Date    Back problem     CMV (cytomegalovirus infection) status positive (HCC)     Colitis     Degeneration of lumbar or lumbosacral intervertebral disc 9/23/2016    Depression     Fibromyalgia     GERD (gastroesophageal reflux disease)     Horseshoe kidney     Hyperlipidemia     Neuropathy     Sciatica      Past Surgical History:   Procedure Laterality Date    APPENDECTOMY      CARPAL TUNNEL RELEASE      CHOLECYSTECTOMY      COLONOSCOPY  2011    COLONOSCOPY  8/29/2013    [facility]    ENDOSCOPY, COLON, DIAGNOSTIC  2011    EPIDURAL STEROID INJECTION Right 6/6/2019    RIGHT CERVICAL THREE FOUR, CERVICAL FOUR FIVE, CERVICAL FIVE SIX, CERVICAL SIX SEVEN FACET INJECTION SITE CONFIRMED BY FLUOROSCOPY performed by Sharon Reyes MD at [facility]    EPIDURAL STEROID INJECTION Right 7/18/2019    RIGHT CERVICAL FOUR, CERVICAL FIVE, CERVICAL SIX, CERVICAL SEVEN MEDIAL BRANCH BLOCK SITE CONFIRMED BY FLUOROSCOPY [facility] Right 8/29/2019    RIGHT CERVICAL FOUR, CERVICAL FIVE, CERVICAL SIX, CERVICAL SEVEN RADIOFREQUENCY ABLATION SITE CONFIRMED BY FLUOROSCOPY performed by Mello Zamora MD at [address]      complete    TUBAL LIGATION      UPPER GASTROINTESTINAL ENDOSCOPY  8/29/2013    gastritis bx done     Family History   Problem Relation Age of Onset    Heart Disease Father      Social History     Socioeconomic History    Marital status:       Spouse name: Not on file    Number of children: Not on file    Years of education: Not on file    Highest education level: Not on file   Occupational History    Not on file   Social Needs    Financial resource strain: Not on file    Food insecurity:     Worry: Not on file     Inability: Not on file    Transportation needs:     Medical: Not on file     Non-medical: Not on file   Tobacco Use    Smoking status: Current Every Day Smoker     Packs/day: 0.50     Years: 30.00     Pack years: 15.00    Smokeless tobacco: Never Used   Substance and Sexual Activity    Alcohol use: No    Drug use: No     Frequency: 1.0 times per week     Types: Marijuana    Sexual activity: Not Currently   Lifestyle    Physical activity:     Days per week: Not on file     Minutes per session: Not on file    Stress: Not on file   Relationships    Social connections:     Talks on phone: Not on file     Gets together: Not on file     Attends Confucianist service: Not on file     Active member of club or organization: Not on file     Attends meetings of clubs or organizations: Not on file     Relationship status: Not on file    Intimate partner violence:     Fear of current or ex partner: Not on file     Emotionally deformities. Distal pulses +2 and cap refill less than 5 seconds. SKIN: Warm and dry. Rashes, clubbing, or cyanosis. NEUROLOGICAL: Alert and oriented. Strength is 5/5 in all extremities and sensation is intact. Patellar DTRs +2 bilaterally. Patient observed ambulating to the emergency department without difficulty. HSNE spine intact. ED COURSE   I have independently evaluated this patient. Pain control was not required while here in the emergency department. Findings at this time appear consistent with an acute exacerbation of chronic back pain. I will be discharged with symptomatic treatments with recommendations for continued spine and PT follow-up. Have discussed return precautions otherwise and patient is in agreement and comfortable at discharge. A discussion was had with Ms. Pastrana regarding low back pain, ED findings and recommendations for follow-up. All questions were answered. Patient will follow up with Christine in physical therapy within 2 to 3 days for further evaluation/treatment. Patient will return to ED for new/worsening symptoms. Patient was sent home with a prescription for Percocet, prednisone, and Flexeril. MDM  I estimate there is LOW risk for ABDOMINAL AORTIC ANEURYSM, CAUDA EQUINA SYNDROME, EPIDURAL MASS LESION, SPINAL STENOSIS, OR HERNIATED DISK CAUSING SEVERE STENOSIS, thus I consider the discharge disposition reasonable. Herber Lynn and I have discussed the diagnosis and risks, and we agree with discharging home to follow-up with their primary doctor. We also discussed returning to the Emergency Department immediately if new or worsening symptoms occur. We have discussed the symptoms which are most concerning (e.g., saddle anesthesia, urinary or bowel incontinence or retention, changing or worsening pain) that necessitate immediate return. Final Impression  1.  Acute exacerbation of chronic low back pain      Blood pressure (!) 139/105, pulse 86, temperature

## 2020-01-11 NOTE — ED NOTES
Pt states that she has no more muscle relaxants and can't tolerate this low back pain, NKI.      Jerry Waterman RN  01/11/20 0796

## 2020-01-13 ENCOUNTER — HOSPITAL ENCOUNTER (OUTPATIENT)
Dept: PHYSICAL THERAPY | Age: 55
Setting detail: THERAPIES SERIES
Discharge: HOME | End: 2020-01-13
Payer: COMMERCIAL

## 2020-01-13 PROCEDURE — 97110 THERAPEUTIC EXERCISES: CPT

## 2020-01-13 PROCEDURE — 97140 MANUAL THERAPY 1/> REGIONS: CPT

## 2020-01-13 NOTE — FLOWSHEET NOTE
Physical Therapy Daily Treatment Note    Date:  2020    Patient Name:  Antonieta Wells    :  1965  MRN: [id number]  Restrictions/Precautions:    Medical/Treatment Diagnosis Information:  Diagnosis: cervical disc disease C6-7 with R radiculopathy  Insurance/Certification information:  PT Insurance Information: [employer] Dual - 30 visits then pre-cert  Physician Information:  Referring Practitioner: Hakan Romero CNP  Plan of care signed (Y/N):  N  Visit# / total visits:       G-Code (if applicable):          NDI 37/50    Medicare Cap (if applicable):  n/a = total amount used, updated 2020    Time in:  8:35      Timed Treatment: 25 Total Treatment Time:  25  Time out: 9:00  ________________________________________________________________________________________    Pain Level:   8/10 neck pain, 9/10 back pain   SUBJECTIVE:  Pt reports that she had to go to ED over the weekend due to back pain. Was given a steroid dose pack and muscle relaxors which have helped \"a little\". Neck pain is improving sporadically - less radicular pain into her arm. OBJECTIVE:     Exercise/Equipment Resistance/Repetitions Other comments          SNF neuro re-ed   x3 minutes against PT resistance  x2 minute against self resistance  With cervical rotation 10x B     HEP    HEP   scap squeeze 10x5\" HEP                                                                                                                        Other Therapeutic Activities:      Manual Treatments:         Suboccipital release  Gentle manual tractions  Gentle side glide oscillations throughout c-spine B gr I-II  CTJ harmonics  1st rib oscillations on R     Total manual 15 minutes     Modalities:     Test/Measurements:  See initial eval       ASSESSMENT:    Pt tolerated gentle manual treatment well with exception of R upper trap (pt stated that it was a fibro trigger point and requested PT to stay away from that point).  Pt remains very guarded to movement and palpation, and did not report significant change in pain levels and/or motion post treatment. Progress as pt tolerates. Treatment/Activity Tolerance:   []Patient tolerated treatment well [] Patient limited by fatique  [x]Patient limited by pain [] Patient limited by other medical complications  [] Other:     Goals:          Long term goals  Time Frame for Long term goals : 4 weeks  Long term goal 1: Pt will be independent with HEP  Long term goal 2: Pt will report 50% improvement in frequency and intensity of pain  Long term goal 3: Pt will demonstrate WFL cervical and R shoulder active ROM  Long term goal 4: Pt will demonstrate improved posture 50% of the time without cueing in clinic  Long term goal 5: Pt will improve NDI by 7 points or more to indicate significant change.       Plan: [x] Continue per plan of care [] Alter current plan (see comments)   [] Plan of care initiated [] Hold pending MD visit [] Discharge      Plan for Next Session:      Re-Certification Due Date:         Signature:  Brandy Geiger PT

## 2020-01-16 ENCOUNTER — HOSPITAL ENCOUNTER (OUTPATIENT)
Dept: PHYSICAL THERAPY | Age: 55
Setting detail: THERAPIES SERIES
Discharge: HOME | End: 2020-01-16
Payer: COMMERCIAL

## 2020-01-16 PROCEDURE — 97110 THERAPEUTIC EXERCISES: CPT

## 2020-01-16 PROCEDURE — 97140 MANUAL THERAPY 1/> REGIONS: CPT

## 2020-01-16 NOTE — FLOWSHEET NOTE
of R upper trap (pt stated that it was a fibro trigger point and requested PT to stay away from that point). Pt remains very guarded to movement and palpation, and did not report significant change in pain levels and/or motion post treatment. Observed full cervical rotation in supine, but guarded rotation is sitting. Poor tolerance to initiation of shoulder exercises and given as home exercise to improve tolerance. Progress as pt tolerates. Treatment/Activity Tolerance:   []Patient tolerated treatment well [] Patient limited by fatique  [x]Patient limited by pain [] Patient limited by other medical complications  [] Other:     Goals:          Long term goals  Time Frame for Long term goals : 4 weeks  Long term goal 1: Pt will be independent with HEP  Long term goal 2: Pt will report 50% improvement in frequency and intensity of pain  Long term goal 3: Pt will demonstrate WFL cervical and R shoulder active ROM  Long term goal 4: Pt will demonstrate improved posture 50% of the time without cueing in clinic  Long term goal 5: Pt will improve NDI by 7 points or more to indicate significant change.       Plan: [x] Continue per plan of care [] Alter current plan (see comments)   [] Plan of care initiated [] Hold pending MD visit [] Discharge      Plan for Next Session:      Re-Certification Due Date:         Signature:  Marina Arnett , PT

## 2020-01-20 ENCOUNTER — HOSPITAL ENCOUNTER (OUTPATIENT)
Dept: PHYSICAL THERAPY | Age: 55
Setting detail: THERAPIES SERIES
Discharge: HOME | End: 2020-01-20
Payer: COMMERCIAL

## 2020-01-20 PROCEDURE — 97140 MANUAL THERAPY 1/> REGIONS: CPT

## 2020-01-20 PROCEDURE — 97110 THERAPEUTIC EXERCISES: CPT

## 2020-01-20 NOTE — FLOWSHEET NOTE
Physical Therapy Daily Treatment Note    Date:  2020    Patient Name:  Eileen Hurst    :  1965  MRN: [id number]  Restrictions/Precautions:    Medical/Treatment Diagnosis Information:  Diagnosis: cervical disc disease C6-7 with R radiculopathy  Insurance/Certification information:  PT Insurance Information: [facility] - 30 visits then pre-cert  Physician Information:  Referring Practitioner: Yahaira Croft CNP  Plan of care signed (Y/N):  N  Visit# / total visits:       G-Code (if applicable):          NDI 37/50    Medicare Cap (if applicable):  n/a = total amount used, updated 2020    Time in:  8:30      Timed Treatment: 30 Total Treatment Time:  30  Time out: 9:00  ________________________________________________________________________________________    Pain Level:   7/10 neck pain, 9/10 back pain   SUBJECTIVE:  Pt reports that she is not doing well. Notes that the pain is still going into her R shoulder and shoulder blades. OBJECTIVE:     Exercise/Equipment Resistance/Repetitions Other comments          SNF neuro re-ed   x3 minutes against PT resistance  x2 minute against self resistance  With cervical rotation 10x B     HEP    HEP   scap squeeze 10x5\" HEP          B shld ER   HEP; Poor tolerance   B shld horiz ABD HEP; Poor tolerance                                                                                                    Other Therapeutic Activities:      Manual Treatments:         Suboccipital release  Gentle manual tractions  Gentle side glide oscillations throughout c-spine B gr I-II  CTJ harmonics  1st rib oscillations on R   STM to cervical paraspinals  Seated CTJ mobilization with cavitation  Seated thoracic hug mobilization     Total manual 15 minutes     Modalities:     Test/Measurements:      SB  - R 35* (35 prior to treatment), L38* (26 prior to treatment)  F/E arc - 100* (82 prior to treatment)       ASSESSMENT:    Pt tolerated gentle manual treatment fair. Good tolerance to exercise, no complaints. Pt demonstrated increased cervical ROM (via inclinometer) following session. Treatment/Activity Tolerance:   []Patient tolerated treatment well [] Patient limited by fatique  [x]Patient limited by pain [] Patient limited by other medical complications  [] Other:     Goals:          Long term goals  Time Frame for Long term goals : 4 weeks  Long term goal 1: Pt will be independent with HEP  Long term goal 2: Pt will report 50% improvement in frequency and intensity of pain  Long term goal 3: Pt will demonstrate WFL cervical and R shoulder active ROM  Long term goal 4: Pt will demonstrate improved posture 50% of the time without cueing in clinic  Long term goal 5: Pt will improve NDI by 7 points or more to indicate significant change.       Plan: [x] Continue per plan of care [] Alter current plan (see comments)   [] Plan of care initiated [] Hold pending MD visit [] Discharge      Plan for Next Session:      Re-Certification Due Date:         Signature:  Estrellita Canales PT

## 2020-01-23 ENCOUNTER — HOSPITAL ENCOUNTER (OUTPATIENT)
Dept: PHYSICAL THERAPY | Age: 55
Setting detail: THERAPIES SERIES
Discharge: HOME | End: 2020-01-23
Payer: COMMERCIAL

## 2020-01-23 PROCEDURE — 97140 MANUAL THERAPY 1/> REGIONS: CPT

## 2020-01-27 ENCOUNTER — HOSPITAL ENCOUNTER (OUTPATIENT)
Dept: PHYSICAL THERAPY | Age: 55
Setting detail: THERAPIES SERIES
Discharge: HOME | End: 2020-01-27
Payer: COMMERCIAL

## 2020-01-27 PROCEDURE — 97112 NEUROMUSCULAR REEDUCATION: CPT

## 2020-01-27 PROCEDURE — 97164 PT RE-EVAL EST PLAN CARE: CPT

## 2020-01-27 NOTE — PROGRESS NOTES
per plan of care [] Alter current plan (see comments)   [] Plan of care initiated [] Hold pending MD visit [x] Discharge      Plan for Next Session:      Re-Certification Due Date:         Signature:  Herman Gardner PT              Physician Signature:_______________________________Date:__________________  By signing above (or electronic signature), therapists plan is approved by physician

## 2020-01-27 NOTE — FLOWSHEET NOTE
scap/subscap - tolerating minimal pressure   Observation: forward head posture, elevated shoulders  Body Mechanics: R AGMR with 4 finger width in supine from R shoulder to plinth; aberrant scapular motion pattern on R with elevation      PROM RUE (degrees)  RUE General PROM: pt unable to relax to allow for accurate assessment of passive ROM  AROM RUE (degrees)  RUE AROM : Exceptions  R Shoulder Flexion 0-180: 90 degrees with pain  R Shoulder ABduction 0-180: 90 degrees with pain  R Shoulder Int Rotation  0-70: to side of ilium (L WFL)  R Shoulder Ext Rotation 0-90: to side of occiput (L WFL)  AROM LUE (degrees)  LUE AROM : WFL  Spine  Cervical: rotation R decreased 25% with pain, rotation L decreased 50% with mild pain, side bend L increased pain and decreased 50%, side bend R with stretching with decreased motion 50%, extension decreased 25%, flexion decreased 50%  Special Tests: (-) CN screen, (-) cervical instability testing      Strength RUE  Strength RUE: Exception  R Shoulder Flexion: 3-/5(limited due to ROM)  R Shoulder ABduction: 3-/5(limited due to ROM)  R Shoulder Internal Rotation: 5/5  R Shoulder External Rotation: 5/5  R Elbow Flexion: 5/5  R Elbow Extension: 5/5  R Wrist Flexion: 5/5  R Wrist Extension: 5/5  Strength LUE  Strength LUE: WFL  Strength Other  Other: pincher 12# on R, 14# on L,  40# on R, 35# on L       ASSESSMENT:    Pt progressed poorly with course of physical therapy, and displays similar symptoms from initial evaluation. Pt demonstrated some positive and some negative changes in strength and ROM B. She continues to struggle with ADLs and driving. Pt with poor tolerance to all manual therapy and exercise throughout 4 weeks of PT. Pt is to follow-up with MD for further option in her POC. Pt would benefit from additional testing at this time due to feeling of heaviness, N&T, HAs and quadriplegic symptoms. Will discharge pt from formal PT at this time.  See tests and measures for

## 2020-01-30 ENCOUNTER — APPOINTMENT (OUTPATIENT)
Dept: PHYSICAL THERAPY | Age: 55
End: 2020-01-30
Payer: COMMERCIAL

## 2020-02-20 ENCOUNTER — HOSPITAL ENCOUNTER (OUTPATIENT)
Age: 55
Setting detail: OUTPATIENT SURGERY
Discharge: HOME | End: 2020-02-20
Attending: PAIN MEDICINE | Admitting: PAIN MEDICINE
Payer: COMMERCIAL

## 2020-02-20 VITALS
RESPIRATION RATE: 12 BRPM | HEIGHT: 64 IN | TEMPERATURE: 97.8 F | BODY MASS INDEX: 33.46 KG/M2 | WEIGHT: 196 LBS | OXYGEN SATURATION: 100 % | HEART RATE: 81 BPM | SYSTOLIC BLOOD PRESSURE: 143 MMHG | DIASTOLIC BLOOD PRESSURE: 87 MMHG

## 2020-02-20 PROCEDURE — 6360000004 HC RX CONTRAST MEDICATION: Performed by: PAIN MEDICINE

## 2020-02-20 PROCEDURE — 6360000002 HC RX W HCPCS: Performed by: PAIN MEDICINE

## 2020-02-20 PROCEDURE — 3600000012 HC SURGERY LEVEL 2 ADDTL 15MIN: Performed by: PAIN MEDICINE

## 2020-02-20 PROCEDURE — 2709999900 HC NON-CHARGEABLE SUPPLY: Performed by: PAIN MEDICINE

## 2020-02-20 PROCEDURE — 20610 DRAIN/INJ JOINT/BURSA W/O US: CPT | Performed by: PAIN MEDICINE

## 2020-02-20 PROCEDURE — 3600000002 HC SURGERY LEVEL 2 BASE: Performed by: PAIN MEDICINE

## 2020-02-20 PROCEDURE — 7100000010 HC PHASE II RECOVERY - FIRST 15 MIN: Performed by: PAIN MEDICINE

## 2020-02-20 PROCEDURE — 2500000003 HC RX 250 WO HCPCS: Performed by: PAIN MEDICINE

## 2020-02-20 RX ORDER — LIDOCAINE HYDROCHLORIDE 10 MG/ML
INJECTION, SOLUTION EPIDURAL; INFILTRATION; INTRACAUDAL; PERINEURAL PRN
Status: DISCONTINUED | OUTPATIENT
Start: 2020-02-20 | End: 2020-02-20 | Stop reason: ALTCHOICE

## 2020-02-20 ASSESSMENT — PAIN DESCRIPTION - DESCRIPTORS: DESCRIPTORS: ACHING;SHARP

## 2020-02-20 NOTE — PROCEDURES
Clifford De La Torre is a 53 y.o. female patient. No diagnosis found. Past Medical History:   Diagnosis Date    Back problem     CMV (cytomegalovirus infection) status positive (HCC)     Colitis     Degeneration of lumbar or lumbosacral intervertebral disc 9/23/2016    Depression     Fibromyalgia     GERD (gastroesophageal reflux disease)     Horseshoe kidney     Hyperlipidemia     Neuropathy     Sciatica      Blood pressure (!) 158/98, pulse 85, temperature 97.8 °F (36.6 °C), temperature source Temporal, resp. rate 20, height 5' 4\" (1.626 m), weight 196 lb (88.9 kg), SpO2 100 %, not currently breastfeeding. Procedures    Sharon Reyes MD  2/20/2020    R KNEE INJECTION 96942 with [id number]  DX: M17.11, M17.12     Pt supine. Area sterile prep. 22g5 needle placed under sterile condition into the knee joint using fluoroscopic guidance to contact the medial inferior femoral condyle lateral to the patellar tendon. Dye spread appropriate. 40mg DEPOMEDROL mixed with 0.75 cc 1% lido injected after neg asp for blood. ESTIMATED BLOOD LOSS:  Less than 1 cc      Pt tolerated procedure well. Exact similar procedure was done at the lateral condyle.

## 2020-02-24 ENCOUNTER — OFFICE VISIT (OUTPATIENT)
Dept: INTERNAL MEDICINE CLINIC | Age: 55
End: 2020-02-24
Payer: COMMERCIAL

## 2020-02-24 VITALS
SYSTOLIC BLOOD PRESSURE: 138 MMHG | BODY MASS INDEX: 34.49 KG/M2 | DIASTOLIC BLOOD PRESSURE: 78 MMHG | OXYGEN SATURATION: 98 % | HEIGHT: 64 IN | HEART RATE: 102 BPM | WEIGHT: 202 LBS

## 2020-02-24 PROBLEM — M54.41 CHRONIC BILATERAL LOW BACK PAIN WITH RIGHT-SIDED SCIATICA: Status: ACTIVE | Noted: 2020-02-24

## 2020-02-24 PROBLEM — G89.29 CHRONIC BILATERAL LOW BACK PAIN WITH RIGHT-SIDED SCIATICA: Status: ACTIVE | Noted: 2020-02-24

## 2020-02-24 PROBLEM — M50.20 CERVICAL HERNIATED DISC: Status: ACTIVE | Noted: 2020-02-24

## 2020-02-24 PROCEDURE — 3017F COLORECTAL CA SCREEN DOC REV: CPT | Performed by: INTERNAL MEDICINE

## 2020-02-24 PROCEDURE — 4004F PT TOBACCO SCREEN RCVD TLK: CPT | Performed by: INTERNAL MEDICINE

## 2020-02-24 PROCEDURE — G8427 DOCREV CUR MEDS BY ELIG CLIN: HCPCS | Performed by: INTERNAL MEDICINE

## 2020-02-24 PROCEDURE — 99386 PREV VISIT NEW AGE 40-64: CPT | Performed by: INTERNAL MEDICINE

## 2020-02-24 PROCEDURE — G8417 CALC BMI ABV UP PARAM F/U: HCPCS | Performed by: INTERNAL MEDICINE

## 2020-02-24 PROCEDURE — G8484 FLU IMMUNIZE NO ADMIN: HCPCS | Performed by: INTERNAL MEDICINE

## 2020-02-24 PROCEDURE — 99204 OFFICE O/P NEW MOD 45 MIN: CPT | Performed by: INTERNAL MEDICINE

## 2020-02-24 RX ORDER — DULOXETIN HYDROCHLORIDE 30 MG/1
30 CAPSULE, DELAYED RELEASE ORAL DAILY
Qty: 30 CAPSULE | Refills: 0 | Status: SHIPPED | OUTPATIENT
Start: 2020-02-24 | End: 2020-04-01

## 2020-02-24 RX ORDER — OMEPRAZOLE 20 MG/1
20 CAPSULE, DELAYED RELEASE ORAL DAILY
Qty: 30 CAPSULE | Refills: 5 | Status: SHIPPED
Start: 2020-02-24 | End: 2020-09-05 | Stop reason: ALTCHOICE

## 2020-02-24 RX ORDER — TIZANIDINE 4 MG/1
4 TABLET ORAL 3 TIMES DAILY PRN
Qty: 90 TABLET | Refills: 0 | Status: SHIPPED | OUTPATIENT
Start: 2020-02-24 | End: 2020-04-01 | Stop reason: SDUPTHER

## 2020-02-24 RX ORDER — MELOXICAM 15 MG/1
15 TABLET ORAL DAILY
Qty: 30 TABLET | Refills: 0 | Status: SHIPPED | OUTPATIENT
Start: 2020-02-24 | End: 2020-04-01 | Stop reason: SDUPTHER

## 2020-02-24 RX ORDER — VARENICLINE TARTRATE 1 MG/1
1 TABLET, FILM COATED ORAL 2 TIMES DAILY
COMMUNITY
End: 2020-07-05

## 2020-02-24 RX ORDER — ACETAMINOPHEN 500 MG
500 TABLET ORAL EVERY 6 HOURS PRN
COMMUNITY
End: 2020-04-01 | Stop reason: ALTCHOICE

## 2020-02-24 RX ORDER — CYCLOBENZAPRINE HCL 10 MG
10 TABLET ORAL 3 TIMES DAILY PRN
COMMUNITY
End: 2020-02-24 | Stop reason: ALTCHOICE

## 2020-02-24 RX ORDER — IBUPROFEN 800 MG/1
800 TABLET ORAL EVERY 6 HOURS PRN
COMMUNITY
End: 2020-02-24 | Stop reason: ALTCHOICE

## 2020-02-24 ASSESSMENT — PATIENT HEALTH QUESTIONNAIRE - PHQ9
SUM OF ALL RESPONSES TO PHQ9 QUESTIONS 1 & 2: 0
2. FEELING DOWN, DEPRESSED OR HOPELESS: 0
SUM OF ALL RESPONSES TO PHQ QUESTIONS 1-9: 0
SUM OF ALL RESPONSES TO PHQ QUESTIONS 1-9: 0
1. LITTLE INTEREST OR PLEASURE IN DOING THINGS: 0

## 2020-02-24 NOTE — PATIENT INSTRUCTIONS
Preventive plan of care for Herber Lynn        2/24/2020           Preventive Measures Status       Recommendations for screening                   Diabetes Screen  Glucose (mg/dL)   Date Value   09/03/2016 96    Test recommended and ordered   Cholesterol Screen  Lab Results   Component Value Date    CHOL 242 (H) 08/12/2013    TRIG 146 08/12/2013    HDL 71 (H) 08/12/2013    LDLCALC 142 (H) 08/12/2013    Test recommended and ordered       Weight: Body mass index is 34.67 kg/m².   5' 4\" (1.626 m)202 lb (91.6 kg)    Your BMI is 25 or greater, which indicates that you are overweight        Recommended Immunizations    Immunization History   Administered Date(s) Administered    Pneumococcal Polysaccharide ([id number]) 01/01/2011        Influenza vaccine:  recommended yearly, but declined  Shingles vaccine:  Call insurance regarding coverage for the vaccine           Other Recommendations ·   See a dentist every 6 months  · Try to get at least 30 minutes of exercise 3-5 days per week  · Always wear a seat belt when traveling in a car  · Always wear a helmet when riding a bicycle or motorcycle  · When exposed to the sun, use a sunscreen that protects against both UVA and UVB radiation with an SPF of 30 or greater- reapply every 2 to 3 hours or after sweating, drying off with a towel, or swimming  · You need 2471-2579 mg of calcium and 5627-4001 IU of vitamin D per day- it is possible to meet your calcium requirement with diet alone, but a vitamin D supplement is usually necessary

## 2020-02-24 NOTE — PROGRESS NOTES
[facility]  History and Physical  Silver Florian M.D. Niharika Luciano  YOB: 1965    Date of Service:  2/24/2020    Chief Complaint:   Niharika Luciano is a 53 y.o. female who presents for   Chief Complaint   Patient presents with   [facility] [employer] Establish Care       HPI: Here for Annual Physical and Follow up. Fibromyalgia:  Wants to restart Cymbalta since she's been off for 2 years.    Chronic neck and back pain:  Wants to be back on Zanaflex and NSAID  The 10-year ASCVD risk score (Christal Harris et al., 2013) is: 6%    Values used to calculate the score:      Age: 53 years      Sex: Female      Is Non- : No      Diabetic: No      Tobacco smoker: Yes      Systolic Blood Pressure: 953 mmHg      Is BP treated: No      HDL Cholesterol: 62 mg/dL      Total Cholesterol: 240 mg/dL      Lab Results   Component Value Date    LABMICR Not Indicated 09/03/2016     Lab Results   Component Value Date     02/19/2019    K 4.4 02/19/2019     02/19/2019    CO2 30 02/19/2019    BUN 17 02/19/2019    CREATININE 0.63 02/19/2019    GLUCOSE 92 02/19/2019    CALCIUM 1.4 02/19/2019     Lab Results   Component Value Date    CHOL 240 02/19/2019    TRIG 146 02/19/2019    HDL 62 02/19/2019    LDLCALC 149 02/19/2019     Lab Results   Component Value Date    ALT 24 02/19/2019    AST 22 02/19/2019     Lab Results   Component Value Date    TSH 1.37 02/19/2019     Lab Results   Component Value Date    WBC 8.9 09/03/2016    HGB 14.7 09/03/2016    HCT 42.7 09/03/2016    MCV 90.8 09/03/2016     09/03/2016     Lab Results   Component Value Date    INR 0.84 (L) 08/11/2013      No results found for: PSA   No results found for: LABURIC         Wt Readings from Last 3 Encounters:   02/24/20 202 lb (91.6 kg)   02/20/20 196 lb (88.9 kg)   01/11/20 195 lb (88.5 kg)     BP Readings from Last 3 Encounters:   02/24/20 138/78   02/20/20 (!) 143/87   01/11/20 (!) 139/105       Patient Active [address] OR    EPIDURAL STEROID INJECTION Right 7/18/2019    RIGHT CERVICAL FOUR, CERVICAL FIVE, CERVICAL SIX, CERVICAL SEVEN MEDIAL BRANCH BLOCK SITE CONFIRMED BY FLUOROSCOPY performed by Katerina Zapata MD at [facility] Right 8/29/2019    RIGHT CERVICAL FOUR, CERVICAL FIVE, CERVICAL SIX, CERVICAL SEVEN RADIOFREQUENCY ABLATION SITE CONFIRMED BY FLUOROSCOPY performed by Katerina Zapata MD at [facility]      complete    PAIN MANAGEMENT PROCEDURE N/A 2/20/2020    KNEE MEDIAL AND LATERAL INTRA-ARTICULAR INJECTION SITE CONFIRMED BY FLUOROSCOPY performed by Katerina Zapata MD at [place] [address] ENDOSCOPY  8/29/2013    gastritis bx done     Family History   Problem Relation Age of Onset    Cancer Father     Heart Attack Father 72    Breast Cancer Mother     Other Mother         fibromyalgia    Cancer Sister         melanoma    Breast Cancer Maternal Grandmother      Social History     Socioeconomic History    Marital status:       Spouse name: Not on file    Number of children: Not on file    Years of education: Not on file    Highest education level: Not on file   Occupational History    Not on file   Social Needs    Financial resource strain: Not on file    Food insecurity:     Worry: Not on file     Inability: Not on file    Transportation needs:     Medical: Not on file     Non-medical: Not on file   Tobacco Use    Smoking status: Current Every Day Smoker     Packs/day: 0.01     Years: 30.00     Pack years: 0.30     Types: Cigarettes    Smokeless tobacco: Never Used   Substance and Sexual Activity    Alcohol use: No    Drug use: No    Sexual activity: Not Currently   Lifestyle    Physical activity:     Days per week: Not on file     Minutes per session: Not on file    Stress: Not on file   Relationships    Social connections:     Talks on phone: Not on file     Gets

## 2020-04-01 ENCOUNTER — TELEPHONE (OUTPATIENT)
Dept: INTERNAL MEDICINE CLINIC | Age: 55
End: 2020-04-01

## 2020-04-01 ENCOUNTER — VIRTUAL VISIT (OUTPATIENT)
Dept: INTERNAL MEDICINE CLINIC | Age: 55
End: 2020-04-01
Payer: COMMERCIAL

## 2020-04-01 VITALS — HEIGHT: 64 IN | BODY MASS INDEX: 33.29 KG/M2 | WEIGHT: 195 LBS

## 2020-04-01 PROCEDURE — 3017F COLORECTAL CA SCREEN DOC REV: CPT | Performed by: INTERNAL MEDICINE

## 2020-04-01 PROCEDURE — 99214 OFFICE O/P EST MOD 30 MIN: CPT | Performed by: INTERNAL MEDICINE

## 2020-04-01 PROCEDURE — G8427 DOCREV CUR MEDS BY ELIG CLIN: HCPCS | Performed by: INTERNAL MEDICINE

## 2020-04-01 RX ORDER — DULOXETIN HYDROCHLORIDE 60 MG/1
60 CAPSULE, DELAYED RELEASE ORAL DAILY
Qty: 30 CAPSULE | Refills: 0 | Status: SHIPPED | OUTPATIENT
Start: 2020-04-01 | End: 2020-07-05

## 2020-04-01 RX ORDER — TIZANIDINE 4 MG/1
4 TABLET ORAL 3 TIMES DAILY PRN
Qty: 90 TABLET | Refills: 10 | Status: SHIPPED
Start: 2020-04-01 | End: 2020-09-05 | Stop reason: ALTCHOICE

## 2020-04-01 RX ORDER — CEPHALEXIN 500 MG/1
500 CAPSULE ORAL 3 TIMES DAILY
Qty: 30 CAPSULE | Refills: 0 | Status: SHIPPED | OUTPATIENT
Start: 2020-04-01 | End: 2020-07-05

## 2020-04-01 RX ORDER — MELOXICAM 15 MG/1
15 TABLET ORAL DAILY
Qty: 30 TABLET | Refills: 0 | Status: SHIPPED | OUTPATIENT
Start: 2020-04-01 | End: 2020-07-05

## 2020-04-01 NOTE — TELEPHONE ENCOUNTER
Patient states she is having difficulty accessing the doxy. me site due to saying her microphone doesn't have permission. Patient doesn't know what kind of phone she has. Tried explaining to go through settings/privacy/microphone or even the Internet settings. Patient states she will try but asking for phone encounter instead if unable.

## 2020-07-05 ENCOUNTER — HOSPITAL ENCOUNTER (EMERGENCY)
Age: 55
Discharge: HOME | End: 2020-07-05
Payer: COMMERCIAL

## 2020-07-05 ENCOUNTER — APPOINTMENT (OUTPATIENT)
Dept: GENERAL RADIOLOGY | Age: 55
End: 2020-07-05
Payer: COMMERCIAL

## 2020-07-05 VITALS
BODY MASS INDEX: 33.46 KG/M2 | DIASTOLIC BLOOD PRESSURE: 82 MMHG | HEART RATE: 100 BPM | SYSTOLIC BLOOD PRESSURE: 146 MMHG | WEIGHT: 196 LBS | OXYGEN SATURATION: 96 % | TEMPERATURE: 98.4 F | HEIGHT: 64 IN | RESPIRATION RATE: 16 BRPM

## 2020-07-05 PROCEDURE — 73562 X-RAY EXAM OF KNEE 3: CPT

## 2020-07-05 PROCEDURE — 99283 EMERGENCY DEPT VISIT LOW MDM: CPT

## 2020-07-05 RX ORDER — DICLOFENAC SODIUM 75 MG/1
75 TABLET, DELAYED RELEASE ORAL 2 TIMES DAILY
Qty: 14 TABLET | Refills: 0 | Status: SHIPPED | OUTPATIENT
Start: 2020-07-05 | End: 2020-09-05 | Stop reason: ALTCHOICE

## 2020-07-05 ASSESSMENT — PAIN DESCRIPTION - PAIN TYPE: TYPE: ACUTE PAIN

## 2020-07-05 ASSESSMENT — PAIN DESCRIPTION - ORIENTATION: ORIENTATION: RIGHT

## 2020-07-05 ASSESSMENT — PAIN DESCRIPTION - LOCATION: LOCATION: KNEE

## 2020-07-05 ASSESSMENT — PAIN SCALES - GENERAL
PAINLEVEL_OUTOF10: 5
PAINLEVEL_OUTOF10: 5

## 2020-07-06 NOTE — ED PROVIDER NOTES
This patient was not seen and evaluated by the attending physician. CHIEF COMPLAINT  Knee Pain (5 out of 10 constant right knee pain with episodic increases in pain with movement. Been seen by ortho and has been getting injection but  hit knee on table yesterday and is now radiating down the leg.)      HISTORY OF PRESENT ILLNESS  Kate Angeles is a 53 y.o. female who presents to the ED for evaluation of right knee pain. Patient states that she has 5 out of 10 constant right knee pain with pain that seems to get worse with movement. Patient states that she sees pain management and has been getting injections but they do not really do anything. Patient states that yesterday she hit her knee on a table and she has had worsening pain since then. Patient states that she is talked to pain management about wanting an MRI in the knee in the past and states that she is never even had an x-ray. She has no other injuries or complaints. She is here for further evaluation. LOCATION:right knee  QUALITY:ache  SEVERITY:5  DURATION:acute on chronic, worse today  MODIFYING FACTORS:worse with ambulation. No other complaints, modifying factors or associated symptoms. Nursing notes reviewed.    Past Medical History:   Diagnosis Date    Back problem     CMV [facility]cytomegalovirus infection) status positive ([facility])     Colitis     Degeneration of lumbar or lumbosacral intervertebral disc 9/23/2016    Depression     Fibromyalgia     GERD (gastroesophageal reflux disease)     Horseshoe kidney     Hyperlipidemia     Neuropathy     Sciatica      Past Surgical History:   Procedure Laterality Date    APPENDECTOMY      CARPAL TUNNEL RELEASE      CHOLECYSTECTOMY      COLONOSCOPY  2011    [facility]  [facility]    [facility]    ENDOSCOPY, COLON, DIAGNOSTIC  2011    EPIDURAL STEROID INJECTION Right 6/6/2019    RIGHT CERVICAL THREE FOUR, CERVICAL FOUR FIVE, CERVICAL FIVE SIX, CERVICAL SIX SEVEN FACET INJECTION SITE CONFIRMED BY FLUOROSCOPY performed by Jamil Cardona MD at [facility] Right 7/18/2019    RIGHT CERVICAL FOUR, CERVICAL FIVE, CERVICAL SIX, CERVICAL SEVEN MEDIAL BRANCH BLOCK SITE CONFIRMED BY FLUOROSCOPY performed by Jamil Cardona MD at [facility] Right 8/29/2019    RIGHT CERVICAL FOUR, CERVICAL FIVE, CERVICAL SIX, CERVICAL SEVEN RADIOFREQUENCY ABLATION SITE CONFIRMED BY FLUOROSCOPY performed by Jamil Cardona MD at [facility]      complete    PAIN MANAGEMENT PROCEDURE N/A 2/20/2020    KNEE MEDIAL AND LATERAL INTRA-ARTICULAR INJECTION SITE CONFIRMED BY FLUOROSCOPY performed by Jamil Cardona MD at [place] [id number] ENDOSCOPY  8/29/2013    gastritis bx done     Family History   Problem Relation Age of Onset    Cancer Father     Heart Attack Father 72    Breast Cancer Mother     Other Mother         fibromyalgia    Cancer Sister         melanoma    Breast Cancer Maternal Grandmother      Social History     Socioeconomic History    Marital status:       Spouse name: Not on file    Number of children: Not on file    Years of education: Not on file    Highest education level: Not on file   Occupational History    Not on file   Social Needs    Financial resource strain: Not on file    Food insecurity     Worry: Not on file     Inability: Not on file    Transportation needs     Medical: Not on file     Non-medical: Not on file   Tobacco Use    Smoking status: Current Every Day Smoker     Packs/day: 0.01     Years: 30.00     Pack years: 0.30     Types: Cigarettes    Smokeless tobacco: Never Used   Substance and Sexual Activity    Alcohol use: No    Drug use: No    Sexual activity: Not Currently   Lifestyle    Physical activity     Days per week: Not on file     Minutes per session: Not on file    Stress: Not on file   Relationships    Social connections     Talks on phone: Not on file     Gets together: Not on file     Attends Scientologist service: Not on file     Active member of club or organization: Not on file     Attends meetings of clubs or organizations: Not on file     Relationship status: Not on file    Intimate partner violence     Fear of current or ex partner: Not on file     Emotionally abused: Not on file     Physically abused: Not on file     Forced sexual activity: Not on file   Other Topics Concern    Not on file   Social History Narrative    Not on file     No current facility-administered medications for this encounter. Current Outpatient Medications   Medication Sig Dispense Refill    diclofenac (VOLTAREN) 75 MG EC tablet Take 1 tablet by mouth 2 times daily for 7 days 14 tablet 0    tiZANidine (ZANAFLEX) 4 MG tablet Take 1 tablet by mouth 3 times daily as needed (back pain) 90 tablet 10    omeprazole (PRILOSEC) 20 MG delayed release capsule Take 1 capsule by mouth Daily 30 capsule 5     Allergies   Allergen Reactions    Robaxin [Methocarbamol] Nausea Only    Codeine Rash    Hydrocodone-Acetaminophen Itching and Nausea And Vomiting       REVIEW OF SYSTEMS  6 systems reviewed, pertinent positives per HPI otherwise noted to be negative    PHYSICAL EXAM  BP (!) 146/82   Pulse 100   Temp 98.4 °F (36.9 °C) (Oral)   Resp 16   Ht 5' 4\" (1.626 m)   Wt 196 lb (88.9 kg)   SpO2 96%   BMI 33.64 kg/m²   GENERAL APPEARANCE: Awake and alert. Cooperative. No acute distress. HEAD: Normocephalic. Atraumatic. EYES: No outward signs of trauma. No obvious abnormality. EOM's grossly intact. ENT: Mucous membranes are moist.   NECK: Supple. Normal ROM. CHEST: Equal symmetric chest rise. LUNGS: Breathing is unlabored. No obvious respiratory distress. Abdomen: Nondistended  EXTREMITIES: MAEE. No acute deformities. Some mild diffuse tenderness to palpation of the right knee without effusion or ecchymosis or erythema.   2+ dorsalis pedis and posterior tibial pulses in the right lower extremity. Other extremities are atraumatic and nontender. SKIN: Warm and dry. NEUROLOGICAL: Alert and oriented. Strength is 5/5 in all extremities and sensation is intact. PSYCH: Normal Affect  Labs:    No results found for this visit on 07/05/20. RADIOLOGY  Xr Knee Right (3 Views)    Result Date: 7/5/2020  EXAMINATION: THREE XRAY VIEWS OF THE RIGHT KNEE 7/5/2020 2:28 pm COMPARISON: 04/22/2014. HISTORY: ORDERING SYSTEM PROVIDED HISTORY: #KneePain TECHNOLOGIST PROVIDED HISTORY: Reason for exam:->#KneePain Reason for Exam: Knee Pain (5 out of 10 constant right knee pain with episodic increases in pain with movement. Been seen by ortho and has been getting injection but hit knee on table yesterday and is now radiating down the leg.) Acuity: Acute Type of Exam: Initial FINDINGS: No acute fracture or dislocation. Joint spaces are preserved with mild tricompartment osteoarthritic spurring. No joint effusion or focal soft tissue abnormality. No acute osseous abnormality of the right knee. Mild osteoarthritic changes. ED COURSE/MDM  Patient seen and evaluated here in the ER with the supervising physician available for consultation. Patient presented to the emergency room today with complaints of chronic right knee pain that is worse because she hit it yesterday. Patient does see pain management. I recommended that the patient follow-up with her pain management physician, imaging today did show mild osteoarthritic changes without any acute abnormality. Patient is able to bear weight, she had no neurologic deficits. Patient was started on Voltaren, she had an Ace wrap applied. She was instructed to rest ice and elevate. She was discharged in good condition. Patient was given scripts for the following medications. I counseled patient how to take these medications.    Discharge Medication List as of 7/5/2020  3:12 PM      START taking these medications    Details   diclofenac (VOLTAREN) 75 MG EC tablet Take 1 tablet by mouth 2 times daily for 7 days, Disp-14 tablet, R-0Print                 CLINICAL IMPRESSION  1. Acute pain of right knee        Blood pressure (!) 146/82, pulse 100, temperature 98.4 °F (36.9 °C), temperature source Oral, resp. rate 16, height 5' 4\" (1.626 m), weight 196 lb (88.9 kg), SpO2 96 %, not currently breastfeeding. DISPOSITION  Patient was discharged to home in good condition.       Marilu Cagle, APRN - CNP  07/05/20 [id number]

## 2020-09-02 NOTE — PROGRESS NOTES
Pt arrived to PACU,A&O,no c/o pain and/or numbness or tingling, vitals stable, site unremarkable Statement Selected

## 2020-09-05 ENCOUNTER — HOSPITAL ENCOUNTER (EMERGENCY)
Age: 55
Discharge: HOME | End: 2020-09-05
Payer: COMMERCIAL

## 2020-09-05 VITALS
TEMPERATURE: 97.7 F | WEIGHT: 204 LBS | RESPIRATION RATE: 16 BRPM | DIASTOLIC BLOOD PRESSURE: 91 MMHG | BODY MASS INDEX: 34.83 KG/M2 | HEIGHT: 64 IN | OXYGEN SATURATION: 97 % | HEART RATE: 78 BPM | SYSTOLIC BLOOD PRESSURE: 164 MMHG

## 2020-09-05 PROCEDURE — 99283 EMERGENCY DEPT VISIT LOW MDM: CPT

## 2020-09-05 RX ORDER — CYCLOBENZAPRINE HCL 10 MG
10 TABLET ORAL 3 TIMES DAILY PRN
Status: ON HOLD | COMMUNITY
Start: 2019-11-05 | End: 2020-10-27 | Stop reason: SDUPTHER

## 2020-09-05 RX ORDER — TRAMADOL HYDROCHLORIDE 50 MG/1
50 TABLET ORAL EVERY 8 HOURS PRN
Status: DISCONTINUED | OUTPATIENT
Start: 2020-09-05 | End: 2020-09-05

## 2020-09-05 RX ORDER — TRAMADOL HYDROCHLORIDE 50 MG/1
50 TABLET ORAL EVERY 8 HOURS PRN
Qty: 15 TABLET | Refills: 0 | Status: SHIPPED | OUTPATIENT
Start: 2020-09-05 | End: 2020-09-10

## 2020-09-05 RX ORDER — LIDOCAINE 50 MG/G
1 PATCH TOPICAL DAILY
Qty: 10 PATCH | Refills: 0 | Status: SHIPPED | OUTPATIENT
Start: 2020-09-05 | End: 2020-09-15

## 2020-09-05 RX ORDER — MELOXICAM 15 MG/1
15 TABLET ORAL DAILY
Status: ON HOLD | COMMUNITY
Start: 2020-09-02 | End: 2020-10-27 | Stop reason: HOSPADM

## 2020-09-05 ASSESSMENT — PAIN SCALES - GENERAL
PAINLEVEL_OUTOF10: 9
PAINLEVEL_OUTOF10: 9

## 2020-09-05 ASSESSMENT — PAIN DESCRIPTION - LOCATION: LOCATION: NECK

## 2020-09-05 ASSESSMENT — PAIN DESCRIPTION - PAIN TYPE: TYPE: CHRONIC PAIN

## 2020-09-05 NOTE — ED PROVIDER NOTES
[facility] Emergency Department    CHIEF COMPLAINT  Neck Pain (chronic neck pain, followed by [facility], getting surgery scheduled. Referred to  pain clinic, hasn't heard back yet. States has been on Tylenol and Meloxicam with a muscle relaxer, no relief. )      SHARED SERVICE VISIT:  Evaluated by LYNDA. My supervising physician was available for consultation. HISTORY OF PRESENT ILLNESS  Matilde Horton is a 53 y.o. female who presents to the ED complaining of acute on chronic  \"aching\" 9/10 right neck pain with radiation down her right arm into her fingertips. Denies chest pain, shortness of breath, fever, chills, sweats. States that she has been followed by the [facility] and has a preop appointment on 9/14/2020 and was referred to be you see pain clinic but has not yet heard back from them to schedule an appointment. She has been taking Tylenol, meloxicam, and a muscle relaxer with no relief. Saw her PCP who declined to give her pain medication which is why she was referred to pain management. No other complaints, modifying factors or associated symptoms. Nursing notes reviewed.    Past Medical History:   Diagnosis Date    Back problem     CMV (cytomegalovirus infection) status positive (HCC)     Colitis     Degeneration of lumbar or lumbosacral intervertebral disc 9/23/2016    Depression     Fibromyalgia     GERD (gastroesophageal reflux disease)     Horseshoe kidney     Hyperlipidemia     Neuropathy     Sciatica      Past Surgical History:   Procedure Laterality Date    APPENDECTOMY      CARPAL TUNNEL RELEASE      CHOLECYSTECTOMY      COLONOSCOPY  2011    [facility]  [facility]/2013    [facility]    ENDOSCOPY, COLON, DIAGNOSTIC  2011    EPIDURAL STEROID INJECTION Right 6/6/2019    RIGHT CERVICAL THREE FOUR, CERVICAL FOUR FIVE, CERVICAL FIVE SIX, CERVICAL SIX SEVEN FACET INJECTION SITE CONFIRMED BY FLUOROSCOPY performed by Sabas Sosa MD at [facility] Right 2019    RIGHT CERVICAL FOUR, CERVICAL FIVE, CERVICAL SIX, CERVICAL SEVEN MEDIAL BRANCH BLOCK SITE CONFIRMED BY FLUOROSCOPY performed by Angus Muhammad MD at [facility] Right 2019    RIGHT CERVICAL FOUR, CERVICAL FIVE, CERVICAL SIX, CERVICAL SEVEN RADIOFREQUENCY ABLATION SITE CONFIRMED BY FLUOROSCOPY performed by Angus Muhammad MD at [facility]      complete    PAIN MANAGEMENT PROCEDURE N/A 2020    KNEE MEDIAL AND LATERAL INTRA-ARTICULAR INJECTION SITE CONFIRMED BY FLUOROSCOPY performed by Angus Muhammad MD at [place] [id number] ENDOSCOPY  2013    gastritis bx done     Family History   Problem Relation Age of Onset    Cancer Father     Heart Attack Father 72    Breast Cancer Mother     Other Mother         fibromyalgia    Cancer Sister         melanoma    Breast Cancer Maternal Grandmother      Social History     Socioeconomic History    Marital status:       Spouse name: Not on file    Number of children: Not on file    Years of education: Not on file    Highest education level: Not on file   Occupational History    Not on file   Social Needs    Financial resource strain: Not on file    Food insecurity     Worry: Not on file     Inability: Not on file    Transportation needs     Medical: Not on file     Non-medical: Not on file   Tobacco Use    Smoking status: Former Smoker     Packs/day: 0.01     Years: 30.00     Pack years: 0.30     Types: Cigarettes     Last attempt to quit: 2020     Years since quittin.0    Smokeless tobacco: Never Used   Substance and Sexual Activity    Alcohol use: No    Drug use: No    Sexual activity: Not Currently   Lifestyle    Physical activity     Days per week: Not on file     Minutes per session: Not on file    Stress: Not on file   Relationships    Social connections     Talks on phone: Not on file     Gets together: Not on file     Attends Sikhism service: Not on file     Active member of club or organization: Not on file     Attends meetings of clubs or organizations: Not on file     Relationship status: Not on file    Intimate partner violence     Fear of current or ex partner: Not on file     Emotionally abused: Not on file     Physically abused: Not on file     Forced sexual activity: Not on file   Other Topics Concern    Not on file   Social History Narrative    Not on file     No current facility-administered medications for this encounter. Current Outpatient Medications   Medication Sig Dispense Refill    cyclobenzaprine (FLEXERIL) 10 MG tablet Take 10 mg by mouth 3 times daily as needed      meloxicam (MOBIC) 15 MG tablet Take 15 mg by mouth daily       Allergies   Allergen Reactions    Robaxin [Methocarbamol] Nausea Only    Codeine Rash    Hydrocodone-Acetaminophen Itching and Nausea And Vomiting       REVIEW OF SYSTEMS  6 systems reviewed, pertinent positives per HPI otherwise noted to be negative    PHYSICAL EXAM  BP (!) 164/91   Pulse 78   Temp 97.7 °F (36.5 °C) (Oral)   Resp 16   Ht 5' 4\" (1.626 m)   Wt 204 lb (92.5 kg)   SpO2 97%   BMI 35.02 kg/m²   GENERAL APPEARANCE: Awake and alert. Cooperative. No acute distress. HEAD: Normocephalic. Atraumatic. EYES: PERRL. EOM's grossly intact. ENT: Mucous membranes are moist.   NECK: Supple. Normal ROM.  + Tightness/tenderness upon palpation of right trapezius muscle. There is midline cervical tenderness upon palpation. CHEST: Equal symmetric chest rise. LUNGS: Breathing is unlabored. Speaking comfortably in full sentences. Abdomen: Nondistended  Back: There is no midline thoracic or lumbar spine tenderness upon palpation. EXTREMITIES: MAEE. No acute deformities. SKIN: Warm and dry. NEUROLOGICAL: Alert and oriented.  Strength is 5/5 in all extremities and sensation is intact. RADIOLOGY  No results found. ED COURSE  Patient did not require analgesia while in the emergency department. A discussion was had with Mrs. Pastrana regarding neck pain and the need to follow-up with pain management. Further, the patient was informed that the emergency department is not to be used for chronic pain and that she will be given a one-time prescription to help with her pain. Risk management discussed and shared decision making had with patient and/or surrogate. I feel the patient is safe and appropriate for discharge at this time all questions were answered. Patient will follow up with pain management for further evaluation/treatment. Patient will return to ED for new/worsening symptoms. Patient is agreeable with this plan. Patient was sent home with a prescription for tramadol and lidocaine patches. MDM  Patient presents to the emergency department for chronic neck pain. Considered meningitis, osteoarthritis, chronic cervical strain, and fibromyalgia but less likely based on history and physical.    Clinical impression:  Neck pain    DISPOSITION  Patient was discharged to home in good condition.            DARCIE Jaimes CNP  09/05/20 [id number]       DARCIE Jaimes CNP  09/07/20 [id number]

## 2020-09-23 ENCOUNTER — HOSPITAL ENCOUNTER (OUTPATIENT)
Dept: MRI IMAGING | Age: 55
Discharge: HOME | End: 2020-09-23
Payer: COMMERCIAL

## 2020-09-23 PROCEDURE — 72141 MRI NECK SPINE W/O DYE: CPT

## 2020-10-15 NOTE — PROGRESS NOTES
order may or may not be in effect during this procedure. I give my doctor permission to give me blood or blood products. I understand that there are risks with receiving blood such as hepatitis, AIDS, fever, or allergic reaction. I acknowledge that the risks, benefits, and alternatives of this treatment have been explained to me and that no express or implied warranty has been given by the hospital, any blood bank, or any person or entity as to the blood or blood components transfused. At the discretion of my doctor, I agree to allow observers, equipment/product representatives and allow photographing, and/or televising of the procedure, provided my name or identity is maintained confidentially. I agree the hospital may dispose of or use for scientific or educational purposes any tissue, fluid, or body parts which may be removed.     ________________________________Date________Time______ am/pm  ([facility])  Patient or Signature of Closest Relative or Legal Guardian    ________________________________Date________Time______am/pm      Page 1 of  1  Witness

## 2020-10-21 ENCOUNTER — HOSPITAL ENCOUNTER (EMERGENCY)
Age: 55
Discharge: HOME | End: 2020-10-21
Payer: COMMERCIAL

## 2020-10-21 ENCOUNTER — NURSE ONLY (OUTPATIENT)
Dept: PRIMARY CARE CLINIC | Age: 55
End: 2020-10-21
Payer: COMMERCIAL

## 2020-10-21 VITALS
HEIGHT: 64 IN | OXYGEN SATURATION: 97 % | HEART RATE: 74 BPM | TEMPERATURE: 98 F | DIASTOLIC BLOOD PRESSURE: 89 MMHG | SYSTOLIC BLOOD PRESSURE: 147 MMHG | BODY MASS INDEX: 33.46 KG/M2 | RESPIRATION RATE: 22 BRPM | WEIGHT: 196 LBS

## 2020-10-21 PROCEDURE — 99282 EMERGENCY DEPT VISIT SF MDM: CPT

## 2020-10-21 PROCEDURE — 99211 OFF/OP EST MAY X REQ PHY/QHP: CPT | Performed by: NURSE PRACTITIONER

## 2020-10-21 RX ORDER — TRAMADOL HYDROCHLORIDE 50 MG/1
TABLET ORAL
COMMUNITY
Start: 2020-10-13 | End: 2020-10-21

## 2020-10-21 RX ORDER — TIZANIDINE HYDROCHLORIDE 4 MG/1
1 CAPSULE, GELATIN COATED ORAL
Status: ON HOLD | COMMUNITY
End: 2020-10-27 | Stop reason: SDUPTHER

## 2020-10-21 RX ORDER — OXYCODONE HYDROCHLORIDE AND ACETAMINOPHEN 5; 325 MG/1; MG/1
1 TABLET ORAL EVERY 6 HOURS PRN
Qty: 12 TABLET | Refills: 0 | Status: SHIPPED | OUTPATIENT
Start: 2020-10-21 | End: 2020-10-24

## 2020-10-21 ASSESSMENT — PAIN SCALES - GENERAL
PAINLEVEL_OUTOF10: 6
PAINLEVEL_OUTOF10: 10

## 2020-10-21 ASSESSMENT — PAIN DESCRIPTION - ORIENTATION: ORIENTATION: RIGHT

## 2020-10-21 ASSESSMENT — PAIN DESCRIPTION - LOCATION: LOCATION: ARM;HAND

## 2020-10-21 NOTE — ED PROVIDER NOTES
[facility]  ED  EMERGENCY DEPARTMENT ENCOUNTER        Pt Name: Robert Fernández  MRN: [id number]  [place] 1965  Date of evaluation: 10/21/2020  Provider: Willene Brunner, PA  PCP: Alaine Nissen, MD    LYNDA. I have evaluated this patient. My supervising physician was available for consultation. CHIEF COMPLAINT       Chief Complaint   Patient presents with    Hand Pain     pt is having pain in the right arm and hand. Pt states she is having pain in her shoulder and has surgery planned for Tuesday. HISTORY OF PRESENT ILLNESS   (Location, Timing/Onset, Context/Setting, Quality, Duration, Modifying Factors, Severity, Associated Signs and Symptoms)  Note limiting factors. Robert Fernández is a 53 y.o. female who presents the emergency department for right neck, shoulder, hand pain. Patient is a history of cervical radiculopathy, is scheduled for surgery on Tuesday. Notes that she is having pain that is exactly the same as her cervical radiculopathy pain. She has been prescribed tramadol which is not relieving her pain. Denies fever, chest pain, shortness of breath, headache, vision changes, nausea, vomiting, numbness, weakness. Nursing Notes were all reviewed and agreed with or any disagreements were addressed in the HPI. REVIEW OF SYSTEMS    (2-9 systems for level 4, 10 or more for level 5)     Review of Systems    Positives and Pertinent negatives as per HPI. Except as noted above in the ROS, all other systems were reviewed and negative.        PAST MEDICAL HISTORY     Past Medical History:   Diagnosis Date    Back problem     CMV (cytomegalovirus infection) status positive ([facility] [address]     Colitis     Degeneration of lumbar or lumbosacral intervertebral disc 9/23/2016    Depression     Fibromyalgia     GERD (gastroesophageal reflux disease)     Horseshoe kidney     Hyperlipidemia     Neuropathy     Sciatica          SURGICAL HISTORY     Past Surgical History:   Procedure Laterality Date    APPENDECTOMY      CARPAL TUNNEL RELEASE      CHOLECYSTECTOMY      COLONOSCOPY  2011    COLONOSCOPY  8/29/2013    [facility]    ENDOSCOPY, COLON, DIAGNOSTIC  2011    EPIDURAL STEROID INJECTION Right 6/6/2019    RIGHT CERVICAL THREE FOUR, CERVICAL FOUR FIVE, CERVICAL FIVE SIX, CERVICAL SIX SEVEN FACET INJECTION SITE CONFIRMED BY FLUOROSCOPY performed by Jhony Frye MD at [facility] Right 7/18/2019    RIGHT CERVICAL FOUR, CERVICAL FIVE, CERVICAL SIX, CERVICAL SEVEN MEDIAL BRANCH BLOCK SITE CONFIRMED BY FLUOROSCOPY performed by Jhony Frye MD at [facility] Right 8/29/2019    RIGHT CERVICAL FOUR, CERVICAL FIVE, CERVICAL SIX, CERVICAL SEVEN RADIOFREQUENCY ABLATION SITE CONFIRMED BY FLUOROSCOPY performed by Jhony Frye MD at [address] [place] [address]      complete    PAIN MANAGEMENT PROCEDURE N/A 2/20/2020    KNEE MEDIAL AND LATERAL INTRA-ARTICULAR INJECTION SITE CONFIRMED BY FLUOROSCOPY performed by Jhony Frye MD at [place] [address] ENDOSCOPY  8/29/2013    gastritis bx done         [address]       Discharge Medication List as of 10/21/2020  1:09 PM      CONTINUE these medications which have NOT CHANGED    Details   tiZANidine (ZANAFLEX) 4 MG capsule Take 1 capsule by mouthHistorical Med      cyclobenzaprine (FLEXERIL) 10 MG tablet Take 10 mg by mouth 3 times daily as neededHistorical Med      meloxicam (MOBIC) 15 MG tablet Take 15 mg by mouth dailyHistorical Med               ALLERGIES     Robaxin [methocarbamol];  Codeine; and Hydrocodone-acetaminophen    FAMILYHISTORY       Family History   Problem Relation Age of Onset    Cancer Father     Heart Attack Father 72    Breast Cancer Mother     Other Mother         fibromyalgia    Cancer Sister         melanoma    Breast Cancer Maternal Grandmother           SOCIAL HISTORY       Social History     Tobacco Use    Smoking status: Former Smoker     Packs/day: 0.01     Years: 30.00     Pack years: 0.30     Types: Cigarettes     Last attempt to quit: 2020     Years since quittin.2    Smokeless tobacco: Never Used   Substance Use Topics    Alcohol use: No    Drug use: No       SCREENINGS             PHYSICAL EXAM    (up to 7 for level 4, 8 or more for level 5)     ED Triage Vitals [10/21/20 1235]   BP Temp Temp Source Pulse Resp SpO2 Height Weight   (!) 147/89 98 °F (36.7 °C) Oral 74 22 97 % 5' 4\" (1.626 m) 196 lb (88.9 kg)       Physical Exam  Vitals signs reviewed. Constitutional:       Appearance: She is not diaphoretic. HENT:      Nose: No congestion or rhinorrhea. Eyes:      General: No scleral icterus. Conjunctiva/sclera: Conjunctivae normal.   Neck:      Musculoskeletal: Normal range of motion and neck supple. No neck rigidity or muscular tenderness. Cardiovascular:      Rate and Rhythm: Normal rate and regular rhythm. Pulses: Normal pulses. Heart sounds: Normal heart sounds. No murmur. No friction rub. No gallop. Pulmonary:      Effort: Pulmonary effort is normal. No respiratory distress. Breath sounds: Normal breath sounds. No stridor. No wheezing, rhonchi or rales. Abdominal:      Palpations: Abdomen is soft. Musculoskeletal: Normal range of motion. Comments: Median, radial, ulnar nerve motor and sensory function intact. Skin:     General: Skin is warm and dry. Neurological:      General: No focal deficit present. Mental Status: She is alert and oriented to person, place, and time. Sensory: No sensory deficit. Motor: No weakness. Psychiatric:         Mood and Affect: Mood normal.         Behavior: Behavior normal.         DIAGNOSTIC RESULTS   LABS:    Labs Reviewed - No data to display    All other labs were within normal range or not returned as of this dictation. EKG:  All EKG's are interpreted by the Emergency Department Physician in the absence of a cardiologist.  Please see their note for interpretation of EKG. RADIOLOGY:   Non-plain film images such as CT, Ultrasound and MRI are read by the radiologist. Plain radiographic images are visualized and preliminarily interpreted by the ED Provider with the below findings:        Interpretation per the Radiologist below, if available at the time of this note:    No orders to display     No results found. PROCEDURES   Unless otherwise noted below, none     Procedures    CRITICAL CARE TIME   N/A    CONSULTS:  None      EMERGENCY DEPARTMENT COURSE and DIFFERENTIAL DIAGNOSIS/MDM:   Vitals:    Vitals:    10/21/20 1235   BP: (!) 147/89   Pulse: 74   Resp: 22   Temp: 98 °F (36.7 °C)   TempSrc: Oral   SpO2: 97%   Weight: 196 lb (88.9 kg)   Height: 5' 4\" (1.626 m)       Patient was given the following medications:  Medications - No data to display        53-year-old female presents emergency room for exacerbation of her chronic cervical radiculopathy pain. No neurological deficits upper extremity noted on my exam.  No midline cervical tenderness. Prescribed Percocet, instructed to discontinue tramadol if she is taking Percocet. Instructed to return the emerge department for new or worsening symptoms including but not limited to fever, chest pain, shortness of breath, numbness, weakness, headache, severe nausea or vomiting, any other symptoms she is concerned about. Verbal and written discharge instructions and return precautions given. FINAL IMPRESSION      1.  Cervical radiculopathy          DISPOSITION/PLAN   DISPOSITION Decision To Discharge 10/21/2020 01:08:31 PM      PATIENT REFERREDTO:  Nba Escalante MD  [address] [place] [address]  [phone]    Call in 1 day        DISCHARGE MEDICATIONS:  Discharge Medication List as of 10/21/2020  1:09 PM      START taking these medications    Details   oxyCODONE-acetaminophen (PERCOCET) 5-325 MG per tablet Take 1 tablet by mouth every 6 hours as needed for Pain for up to 3 days. Intended supply: 3 days.  Take lowest dose possible to manage pain, Disp-12 tablet,R-0Print             DISCONTINUED MEDICATIONS:  Discharge Medication List as of 10/21/2020  1:09 PM      STOP taking these medications       traMADol (ULTRAM) 50 MG tablet Comments:   Reason for Stopping:                      (Please note that portions of this note were completed with a voice recognition program.  Efforts were made to edit the dictations but occasionally words are mis-transcribed.)    JENNIFER Duffy (electronically signed)         JENNIFER Duffy  10/21/20 5494

## 2020-10-21 NOTE — ED NOTES
Pt scripts x1 instructed to follow up with PCP. Assessed per BUSTER RODRIGUEZ.      Rudolph Arriaga, PATRICKN  15/72/84 [id number]

## 2020-10-22 ENCOUNTER — CARE COORDINATION (OUTPATIENT)
Dept: CARE COORDINATION | Age: 55
End: 2020-10-22

## 2020-10-22 NOTE — PROGRESS NOTES

## 2020-10-22 NOTE — CARE COORDINATION
ED Visit: Cervical Radiculopathy  Pain in neck, shoulder blades, right arm and into right hand. Using heat and Percocet . Helping some. Pt scheduled for surgery on 10/27/2020. (C6-7 ANTERIOR CERVICAL DISCECTOMY AND FUSION)      Patient contacted regarding recent discharge and COVID-19 risk. Discussed COVID-19 related testing which was pending at this time. Test results were pending. Patient informed of results, if available? Pending for surgery     Care Transition Nurse/ Ambulatory Care Manager contacted the patient by telephone to perform post discharge assessment. Verified name and  with patient as identifiers. Patient has following risk factors of: no known risk factors. CTN/ACM reviewed discharge instructions, medical action plan and red flags related to discharge diagnosis. Reviewed and educated them on any new and changed medications related to discharge diagnosis. Advised obtaining a 90-day supply of all daily and as-needed medications. Education provided regarding infection prevention, and signs and symptoms of COVID-19 and when to seek medical attention with patient who verbalized understanding. Discussed exposure protocols and quarantine from [address] you at higher risk for severe illness  and given an opportunity for questions and concerns. The patient agrees to contact the COVID-19 hotline [phone] or PCP office for questions related to their healthcare. CTN/ACM provided contact information for future reference. From CDC: Are you at higher risk for severe illness?  Wash your hands often.  Avoid close contact (6 feet, which is about two arm lengths) with people who are sick.  Put distance between yourself and other people if COVID-19 is spreading in your community.  Clean and disinfect frequently touched surfaces.  Avoid all cruise travel and non-essential air travel.    Call your healthcare professional if you have concerns about COVID-19 and your underlying condition or if you are sick. For more information on steps you can take to protect yourself, see CDC's How to Protect Yourself    Pt declined further calls. Pt was tested d/t upcoming surgery.

## 2020-10-23 LAB — SARS-COV-2: NOT DETECTED

## 2020-10-26 ENCOUNTER — ANESTHESIA EVENT (OUTPATIENT)
Dept: OPERATING ROOM | Age: 55
End: 2020-10-26
Payer: COMMERCIAL

## 2020-10-27 ENCOUNTER — HOSPITAL ENCOUNTER (OUTPATIENT)
Age: 55
Setting detail: OUTPATIENT SURGERY
Discharge: HOME | End: 2020-10-27
Attending: NEUROLOGICAL SURGERY | Admitting: NEUROLOGICAL SURGERY
Payer: COMMERCIAL

## 2020-10-27 ENCOUNTER — ANESTHESIA (OUTPATIENT)
Dept: OPERATING ROOM | Age: 55
End: 2020-10-27
Payer: COMMERCIAL

## 2020-10-27 ENCOUNTER — APPOINTMENT (OUTPATIENT)
Dept: GENERAL RADIOLOGY | Age: 55
End: 2020-10-27
Attending: NEUROLOGICAL SURGERY
Payer: COMMERCIAL

## 2020-10-27 VITALS
BODY MASS INDEX: 34.49 KG/M2 | HEART RATE: 78 BPM | RESPIRATION RATE: 14 BRPM | WEIGHT: 202 LBS | HEIGHT: 64 IN | TEMPERATURE: 97.5 F | SYSTOLIC BLOOD PRESSURE: 132 MMHG | DIASTOLIC BLOOD PRESSURE: 75 MMHG | OXYGEN SATURATION: 96 %

## 2020-10-27 VITALS — TEMPERATURE: 90 F | DIASTOLIC BLOOD PRESSURE: 57 MMHG | SYSTOLIC BLOOD PRESSURE: 102 MMHG | OXYGEN SATURATION: 99 %

## 2020-10-27 LAB
ABO/RH: NORMAL
ANTIBODY SCREEN: NORMAL
APTT: 32.3 SEC (ref 24.2–36.2)
BASOPHILS ABSOLUTE: 0.1 K/UL (ref 0–0.2)
BASOPHILS RELATIVE PERCENT: 0.8 %
EOSINOPHILS ABSOLUTE: 0.1 K/UL (ref 0–0.6)
EOSINOPHILS RELATIVE PERCENT: 1.8 %
HCT VFR BLD CALC: 40 % (ref 36–48)
HEMOGLOBIN: 13.8 G/DL (ref 12–16)
INR BLD: 0.92 (ref 0.86–1.14)
LYMPHOCYTES ABSOLUTE: 2.7 K/UL (ref 1–5.1)
LYMPHOCYTES RELATIVE PERCENT: 36.2 %
MCH RBC QN AUTO: 30.2 PG (ref 26–34)
MCHC RBC AUTO-ENTMCNC: 34.5 G/DL (ref 31–36)
MCV RBC AUTO: 87.7 FL (ref 80–100)
MONOCYTES ABSOLUTE: 0.5 K/UL (ref 0–1.3)
MONOCYTES RELATIVE PERCENT: 6.1 %
NEUTROPHILS ABSOLUTE: 4.1 K/UL (ref 1.7–7.7)
NEUTROPHILS RELATIVE PERCENT: 55.1 %
PDW BLD-RTO: 12.9 % (ref 12.4–15.4)
PLATELET # BLD: 216 K/UL (ref 135–450)
PMV BLD AUTO: 8.2 FL (ref 5–10.5)
PROTHROMBIN TIME: 10.7 SEC (ref 10–13.2)
RBC # BLD: 4.56 M/UL (ref 4–5.2)
WBC # BLD: 7.5 K/UL (ref 4–11)

## 2020-10-27 PROCEDURE — 2709999900 HC NON-CHARGEABLE SUPPLY: Performed by: NEUROLOGICAL SURGERY

## 2020-10-27 PROCEDURE — 6360000002 HC RX W HCPCS

## 2020-10-27 PROCEDURE — 7100000001 HC PACU RECOVERY - ADDTL 15 MIN: Performed by: NEUROLOGICAL SURGERY

## 2020-10-27 PROCEDURE — 6360000002 HC RX W HCPCS: Performed by: NEUROLOGICAL SURGERY

## 2020-10-27 PROCEDURE — 2580000003 HC RX 258: Performed by: ANESTHESIOLOGY

## 2020-10-27 PROCEDURE — 2780000010 HC IMPLANT OTHER: Performed by: NEUROLOGICAL SURGERY

## 2020-10-27 PROCEDURE — 7100000000 HC PACU RECOVERY - FIRST 15 MIN: Performed by: NEUROLOGICAL SURGERY

## 2020-10-27 PROCEDURE — 2500000003 HC RX 250 WO HCPCS: Performed by: NURSE ANESTHETIST, CERTIFIED REGISTERED

## 2020-10-27 PROCEDURE — 72040 X-RAY EXAM NECK SPINE 2-3 VW: CPT

## 2020-10-27 PROCEDURE — 3700000001 HC ADD 15 MINUTES (ANESTHESIA): Performed by: NEUROLOGICAL SURGERY

## 2020-10-27 PROCEDURE — 7100000011 HC PHASE II RECOVERY - ADDTL 15 MIN: Performed by: NEUROLOGICAL SURGERY

## 2020-10-27 PROCEDURE — 6370000000 HC RX 637 (ALT 250 FOR IP): Performed by: NEUROLOGICAL SURGERY

## 2020-10-27 PROCEDURE — 3600000014 HC SURGERY LEVEL 4 ADDTL 15MIN: Performed by: NEUROLOGICAL SURGERY

## 2020-10-27 PROCEDURE — 2580000003 HC RX 258: Performed by: NEUROLOGICAL SURGERY

## 2020-10-27 PROCEDURE — 86900 BLOOD TYPING SEROLOGIC ABO: CPT

## 2020-10-27 PROCEDURE — C1713 ANCHOR/SCREW BN/BN,TIS/BN: HCPCS | Performed by: NEUROLOGICAL SURGERY

## 2020-10-27 PROCEDURE — 85610 PROTHROMBIN TIME: CPT

## 2020-10-27 PROCEDURE — 6360000002 HC RX W HCPCS: Performed by: NURSE ANESTHETIST, CERTIFIED REGISTERED

## 2020-10-27 PROCEDURE — 7100000010 HC PHASE II RECOVERY - FIRST 15 MIN: Performed by: NEUROLOGICAL SURGERY

## 2020-10-27 PROCEDURE — 3600000004 HC SURGERY LEVEL 4 BASE: Performed by: NEUROLOGICAL SURGERY

## 2020-10-27 PROCEDURE — 3700000000 HC ANESTHESIA ATTENDED CARE: Performed by: NEUROLOGICAL SURGERY

## 2020-10-27 PROCEDURE — 86901 BLOOD TYPING SEROLOGIC RH(D): CPT

## 2020-10-27 PROCEDURE — 6360000002 HC RX W HCPCS: Performed by: ANESTHESIOLOGY

## 2020-10-27 PROCEDURE — 2500000003 HC RX 250 WO HCPCS: Performed by: ANESTHESIOLOGY

## 2020-10-27 PROCEDURE — 3209999900 FLUORO FOR SURGICAL PROCEDURES

## 2020-10-27 PROCEDURE — 2720000010 HC SURG SUPPLY STERILE: Performed by: NEUROLOGICAL SURGERY

## 2020-10-27 PROCEDURE — 85730 THROMBOPLASTIN TIME PARTIAL: CPT

## 2020-10-27 PROCEDURE — 86850 RBC ANTIBODY SCREEN: CPT

## 2020-10-27 PROCEDURE — 85025 COMPLETE CBC W/AUTO DIFF WBC: CPT

## 2020-10-27 DEVICE — SCREW 7713513 ZEVO VAR SD 3.5MM X 13MM
Type: IMPLANTABLE DEVICE | Site: SPINE CERVICAL | Status: FUNCTIONAL
Brand: ZEVO™ ANTERIOR CERVICAL PLATE SYSTEM

## 2020-10-27 DEVICE — DBM T45001 1CC PASTE GRAFTON PLUS
Type: IMPLANTABLE DEVICE | Site: SPINE CERVICAL | Status: FUNCTIONAL
Brand: GRAFTON®AND GRAFTON PLUS®DEMINERALIZED BONE MATRIX (DBM)

## 2020-10-27 DEVICE — IMPLANT 6240764 ANATOMIC 16X14X7MM
Type: IMPLANTABLE DEVICE | Site: SPINE CERVICAL | Status: FUNCTIONAL
Brand: VERTE-STACK® SPINAL SYSTEM

## 2020-10-27 RX ORDER — PROPOFOL 10 MG/ML
INJECTION, EMULSION INTRAVENOUS PRN
Status: DISCONTINUED | OUTPATIENT
Start: 2020-10-27 | End: 2020-10-27 | Stop reason: SDUPTHER

## 2020-10-27 RX ORDER — PROPOFOL 10 MG/ML
INJECTION, EMULSION INTRAVENOUS CONTINUOUS PRN
Status: DISCONTINUED | OUTPATIENT
Start: 2020-10-27 | End: 2020-10-27 | Stop reason: SDUPTHER

## 2020-10-27 RX ORDER — LORAZEPAM 2 MG/ML
INJECTION INTRAMUSCULAR
Status: COMPLETED
Start: 2020-10-27 | End: 2020-10-27

## 2020-10-27 RX ORDER — ROCURONIUM BROMIDE 10 MG/ML
INJECTION, SOLUTION INTRAVENOUS PRN
Status: DISCONTINUED | OUTPATIENT
Start: 2020-10-27 | End: 2020-10-27 | Stop reason: SDUPTHER

## 2020-10-27 RX ORDER — SUCCINYLCHOLINE/SOD CL,ISO/PF 200MG/10ML
SYRINGE (ML) INTRAVENOUS PRN
Status: DISCONTINUED | OUTPATIENT
Start: 2020-10-27 | End: 2020-10-27 | Stop reason: SDUPTHER

## 2020-10-27 RX ORDER — FENTANYL CITRATE 50 UG/ML
INJECTION, SOLUTION INTRAMUSCULAR; INTRAVENOUS PRN
Status: DISCONTINUED | OUTPATIENT
Start: 2020-10-27 | End: 2020-10-27 | Stop reason: SDUPTHER

## 2020-10-27 RX ORDER — HYDRALAZINE HYDROCHLORIDE 20 MG/ML
5 INJECTION INTRAMUSCULAR; INTRAVENOUS EVERY 10 MIN PRN
Status: DISCONTINUED | OUTPATIENT
Start: 2020-10-27 | End: 2020-10-27 | Stop reason: HOSPADM

## 2020-10-27 RX ORDER — LIDOCAINE HYDROCHLORIDE 20 MG/ML
INJECTION, SOLUTION INTRAVENOUS PRN
Status: DISCONTINUED | OUTPATIENT
Start: 2020-10-27 | End: 2020-10-27 | Stop reason: SDUPTHER

## 2020-10-27 RX ORDER — OXYCODONE HYDROCHLORIDE AND ACETAMINOPHEN 5; 325 MG/1; MG/1
1 TABLET ORAL EVERY 4 HOURS PRN
Status: DISCONTINUED | OUTPATIENT
Start: 2020-10-27 | End: 2020-10-27 | Stop reason: HOSPADM

## 2020-10-27 RX ORDER — KETOROLAC TROMETHAMINE 30 MG/ML
30 INJECTION, SOLUTION INTRAMUSCULAR; INTRAVENOUS ONCE
Status: COMPLETED | OUTPATIENT
Start: 2020-10-27 | End: 2020-10-27

## 2020-10-27 RX ORDER — SODIUM CHLORIDE, SODIUM LACTATE, POTASSIUM CHLORIDE, CALCIUM CHLORIDE 600; 310; 30; 20 MG/100ML; MG/100ML; MG/100ML; MG/100ML
INJECTION, SOLUTION INTRAVENOUS CONTINUOUS
Status: DISCONTINUED | OUTPATIENT
Start: 2020-10-27 | End: 2020-10-27 | Stop reason: HOSPADM

## 2020-10-27 RX ORDER — LABETALOL 20 MG/4 ML (5 MG/ML) INTRAVENOUS SYRINGE
5 EVERY 10 MIN PRN
Status: DISCONTINUED | OUTPATIENT
Start: 2020-10-27 | End: 2020-10-27 | Stop reason: HOSPADM

## 2020-10-27 RX ORDER — SODIUM CHLORIDE 0.9 % (FLUSH) 0.9 %
10 SYRINGE (ML) INJECTION PRN
Status: DISCONTINUED | OUTPATIENT
Start: 2020-10-27 | End: 2020-10-27 | Stop reason: HOSPADM

## 2020-10-27 RX ORDER — ONDANSETRON 2 MG/ML
4 INJECTION INTRAMUSCULAR; INTRAVENOUS
Status: DISCONTINUED | OUTPATIENT
Start: 2020-10-27 | End: 2020-10-27 | Stop reason: HOSPADM

## 2020-10-27 RX ORDER — KETOROLAC TROMETHAMINE 30 MG/ML
30 INJECTION, SOLUTION INTRAMUSCULAR; INTRAVENOUS ONCE
Status: DISCONTINUED | OUTPATIENT
Start: 2020-10-27 | End: 2020-10-27 | Stop reason: SDUPTHER

## 2020-10-27 RX ORDER — FENTANYL CITRATE 50 UG/ML
25 INJECTION, SOLUTION INTRAMUSCULAR; INTRAVENOUS EVERY 5 MIN PRN
Status: DISCONTINUED | OUTPATIENT
Start: 2020-10-27 | End: 2020-10-27 | Stop reason: HOSPADM

## 2020-10-27 RX ORDER — TIZANIDINE HYDROCHLORIDE 4 MG/1
4 CAPSULE, GELATIN COATED ORAL 3 TIMES DAILY PRN
Qty: 60 CAPSULE | Refills: 0 | Status: SHIPPED | OUTPATIENT
Start: 2020-10-27

## 2020-10-27 RX ORDER — MEPERIDINE HYDROCHLORIDE 25 MG/ML
12.5 INJECTION INTRAMUSCULAR; INTRAVENOUS; SUBCUTANEOUS EVERY 5 MIN PRN
Status: DISCONTINUED | OUTPATIENT
Start: 2020-10-27 | End: 2020-10-27 | Stop reason: HOSPADM

## 2020-10-27 RX ORDER — LORAZEPAM 2 MG/ML
1 INJECTION INTRAMUSCULAR
Status: DISCONTINUED | OUTPATIENT
Start: 2020-10-27 | End: 2020-10-27 | Stop reason: HOSPADM

## 2020-10-27 RX ORDER — DEXAMETHASONE SODIUM PHOSPHATE 4 MG/ML
INJECTION, SOLUTION INTRA-ARTICULAR; INTRALESIONAL; INTRAMUSCULAR; INTRAVENOUS; SOFT TISSUE PRN
Status: DISCONTINUED | OUTPATIENT
Start: 2020-10-27 | End: 2020-10-27 | Stop reason: SDUPTHER

## 2020-10-27 RX ORDER — METHOCARBAMOL 750 MG/1
750 TABLET, FILM COATED ORAL 4 TIMES DAILY
Status: DISCONTINUED | OUTPATIENT
Start: 2020-10-27 | End: 2020-10-27 | Stop reason: HOSPADM

## 2020-10-27 RX ORDER — OXYCODONE HYDROCHLORIDE AND ACETAMINOPHEN 5; 325 MG/1; MG/1
1 TABLET ORAL EVERY 4 HOURS PRN
Qty: 60 TABLET | Refills: 0 | Status: SHIPPED | OUTPATIENT
Start: 2020-10-27 | End: 2020-10-30

## 2020-10-27 RX ORDER — DIPHENHYDRAMINE HYDROCHLORIDE 50 MG/ML
12.5 INJECTION INTRAMUSCULAR; INTRAVENOUS
Status: DISCONTINUED | OUTPATIENT
Start: 2020-10-27 | End: 2020-10-27 | Stop reason: HOSPADM

## 2020-10-27 RX ORDER — CEFAZOLIN SODIUM 2 G/50ML
2 SOLUTION INTRAVENOUS ONCE
Status: COMPLETED | OUTPATIENT
Start: 2020-10-27 | End: 2020-10-27

## 2020-10-27 RX ORDER — LIDOCAINE HYDROCHLORIDE 10 MG/ML
1 INJECTION, SOLUTION EPIDURAL; INFILTRATION; INTRACAUDAL; PERINEURAL
Status: DISCONTINUED | OUTPATIENT
Start: 2020-10-27 | End: 2020-10-27 | Stop reason: HOSPADM

## 2020-10-27 RX ORDER — CYCLOBENZAPRINE HCL 10 MG
10 TABLET ORAL 3 TIMES DAILY PRN
Qty: 60 TABLET | Refills: 0 | Status: SHIPPED | OUTPATIENT
Start: 2020-10-27 | End: 2021-07-04 | Stop reason: ALTCHOICE

## 2020-10-27 RX ORDER — FENTANYL CITRATE 50 UG/ML
50 INJECTION, SOLUTION INTRAMUSCULAR; INTRAVENOUS EVERY 5 MIN PRN
Status: DISCONTINUED | OUTPATIENT
Start: 2020-10-27 | End: 2020-10-27 | Stop reason: HOSPADM

## 2020-10-27 RX ORDER — KETOROLAC TROMETHAMINE 30 MG/ML
INJECTION, SOLUTION INTRAMUSCULAR; INTRAVENOUS
Status: COMPLETED
Start: 2020-10-27 | End: 2020-10-27

## 2020-10-27 RX ORDER — PROCHLORPERAZINE EDISYLATE 5 MG/ML
5 INJECTION INTRAMUSCULAR; INTRAVENOUS
Status: DISCONTINUED | OUTPATIENT
Start: 2020-10-27 | End: 2020-10-27 | Stop reason: HOSPADM

## 2020-10-27 RX ORDER — SODIUM CHLORIDE 0.9 % (FLUSH) 0.9 %
10 SYRINGE (ML) INJECTION EVERY 12 HOURS SCHEDULED
Status: DISCONTINUED | OUTPATIENT
Start: 2020-10-27 | End: 2020-10-27 | Stop reason: HOSPADM

## 2020-10-27 RX ORDER — LORAZEPAM 2 MG/ML
1 INJECTION INTRAMUSCULAR ONCE
Status: COMPLETED | OUTPATIENT
Start: 2020-10-27 | End: 2020-10-27

## 2020-10-27 RX ADMIN — PROPOFOL 200 MG: 10 INJECTION, EMULSION INTRAVENOUS at 07:42

## 2020-10-27 RX ADMIN — HYDROMORPHONE HYDROCHLORIDE 0.5 MG: 1 INJECTION, SOLUTION INTRAMUSCULAR; INTRAVENOUS; SUBCUTANEOUS at 09:16

## 2020-10-27 RX ADMIN — REMIFENTANIL HYDROCHLORIDE 0.15 MCG/KG/MIN: 1 INJECTION, POWDER, LYOPHILIZED, FOR SOLUTION INTRAVENOUS at 07:48

## 2020-10-27 RX ADMIN — PROPOFOL 100 MCG/KG/MIN: 10 INJECTION, EMULSION INTRAVENOUS at 07:48

## 2020-10-27 RX ADMIN — ROCURONIUM BROMIDE 10 MG: 10 INJECTION INTRAVENOUS at 07:42

## 2020-10-27 RX ADMIN — FENTANYL CITRATE 50 MCG: 50 INJECTION INTRAMUSCULAR; INTRAVENOUS at 08:50

## 2020-10-27 RX ADMIN — SODIUM CHLORIDE, SODIUM LACTATE, POTASSIUM CHLORIDE, AND CALCIUM CHLORIDE: 600; 310; 30; 20 INJECTION, SOLUTION INTRAVENOUS at 06:25

## 2020-10-27 RX ADMIN — SODIUM CHLORIDE, SODIUM LACTATE, POTASSIUM CHLORIDE, AND CALCIUM CHLORIDE: 600; 310; 30; 20 INJECTION, SOLUTION INTRAVENOUS at 06:10

## 2020-10-27 RX ADMIN — DEXAMETHASONE SODIUM PHOSPHATE 10 MG: 4 INJECTION, SOLUTION INTRAMUSCULAR; INTRAVENOUS at 07:48

## 2020-10-27 RX ADMIN — KETOROLAC TROMETHAMINE 30 MG: 30 INJECTION, SOLUTION INTRAMUSCULAR at 09:42

## 2020-10-27 RX ADMIN — Medication 120 MG: at 07:42

## 2020-10-27 RX ADMIN — SODIUM CHLORIDE, SODIUM LACTATE, POTASSIUM CHLORIDE, AND CALCIUM CHLORIDE: 600; 310; 30; 20 INJECTION, SOLUTION INTRAVENOUS at 07:41

## 2020-10-27 RX ADMIN — HYDROMORPHONE HYDROCHLORIDE 0.5 MG: 1 INJECTION, SOLUTION INTRAMUSCULAR; INTRAVENOUS; SUBCUTANEOUS at 09:22

## 2020-10-27 RX ADMIN — FENTANYL CITRATE 50 MCG: 50 INJECTION INTRAMUSCULAR; INTRAVENOUS at 08:59

## 2020-10-27 RX ADMIN — KETOROLAC TROMETHAMINE 30 MG: 30 INJECTION, SOLUTION INTRAMUSCULAR; INTRAVENOUS at 09:42

## 2020-10-27 RX ADMIN — OXYCODONE HYDROCHLORIDE AND ACETAMINOPHEN 1 TABLET: 5; 325 TABLET ORAL at 10:02

## 2020-10-27 RX ADMIN — LORAZEPAM 1 MG: 2 INJECTION INTRAMUSCULAR at 09:44

## 2020-10-27 RX ADMIN — SODIUM CHLORIDE, SODIUM LACTATE, POTASSIUM CHLORIDE, AND CALCIUM CHLORIDE: 600; 310; 30; 20 INJECTION, SOLUTION INTRAVENOUS at 08:46

## 2020-10-27 RX ADMIN — LIDOCAINE HYDROCHLORIDE 60 MG: 20 INJECTION, SOLUTION INTRAVENOUS at 07:42

## 2020-10-27 RX ADMIN — FENTANYL CITRATE 50 MCG: 50 INJECTION, SOLUTION INTRAMUSCULAR; INTRAVENOUS at 09:30

## 2020-10-27 RX ADMIN — FENTANYL CITRATE 100 MCG: 50 INJECTION INTRAMUSCULAR; INTRAVENOUS at 07:42

## 2020-10-27 RX ADMIN — LORAZEPAM 1 MG: 2 INJECTION INTRAMUSCULAR; INTRAVENOUS at 09:44

## 2020-10-27 RX ADMIN — ROCURONIUM BROMIDE 15 MG: 10 INJECTION INTRAVENOUS at 07:49

## 2020-10-27 RX ADMIN — SODIUM CHLORIDE, SODIUM LACTATE, POTASSIUM CHLORIDE, AND CALCIUM CHLORIDE: 600; 310; 30; 20 INJECTION, SOLUTION INTRAVENOUS at 07:42

## 2020-10-27 RX ADMIN — CEFAZOLIN SODIUM 2 G: 2 SOLUTION INTRAVENOUS at 07:41

## 2020-10-27 ASSESSMENT — PULMONARY FUNCTION TESTS
PIF_VALUE: 20
PIF_VALUE: 18
PIF_VALUE: 11
PIF_VALUE: 12
PIF_VALUE: 18
PIF_VALUE: 21
PIF_VALUE: 19
PIF_VALUE: 27
PIF_VALUE: 8
PIF_VALUE: 19
PIF_VALUE: 18
PIF_VALUE: 22
PIF_VALUE: 10
PIF_VALUE: 20
PIF_VALUE: 22
PIF_VALUE: 16
PIF_VALUE: 10
PIF_VALUE: 21
PIF_VALUE: 21
PIF_VALUE: 15
PIF_VALUE: 21
PIF_VALUE: 21
PIF_VALUE: 18
PIF_VALUE: 21
PIF_VALUE: 17
PIF_VALUE: 19
PIF_VALUE: 21
PIF_VALUE: 22
PIF_VALUE: 20
PIF_VALUE: 19
PIF_VALUE: 1
PIF_VALUE: 20
PIF_VALUE: 21
PIF_VALUE: 19
PIF_VALUE: 13
PIF_VALUE: 2
PIF_VALUE: 17
PIF_VALUE: 10
PIF_VALUE: 21
PIF_VALUE: 0
PIF_VALUE: 21
PIF_VALUE: 20
PIF_VALUE: 18
PIF_VALUE: 21
PIF_VALUE: 21
PIF_VALUE: 2
PIF_VALUE: 21
PIF_VALUE: 43
PIF_VALUE: 21
PIF_VALUE: 25
PIF_VALUE: 19
PIF_VALUE: 21
PIF_VALUE: 21
PIF_VALUE: 2
PIF_VALUE: 17
PIF_VALUE: 10
PIF_VALUE: 21
PIF_VALUE: 19
PIF_VALUE: 21
PIF_VALUE: 16
PIF_VALUE: 22
PIF_VALUE: 21
PIF_VALUE: 1
PIF_VALUE: 21
PIF_VALUE: 16
PIF_VALUE: 21
PIF_VALUE: 2
PIF_VALUE: 22
PIF_VALUE: 10
PIF_VALUE: 21
PIF_VALUE: 16
PIF_VALUE: 0
PIF_VALUE: 13
PIF_VALUE: 1
PIF_VALUE: 19
PIF_VALUE: 21
PIF_VALUE: 21
PIF_VALUE: 43
PIF_VALUE: 20
PIF_VALUE: 21
PIF_VALUE: 3
PIF_VALUE: 16
PIF_VALUE: 21
PIF_VALUE: 21
PIF_VALUE: 10
PIF_VALUE: 18
PIF_VALUE: 16

## 2020-10-27 ASSESSMENT — PAIN DESCRIPTION - LOCATION
LOCATION: NECK

## 2020-10-27 ASSESSMENT — PAIN DESCRIPTION - PAIN TYPE
TYPE: SURGICAL PAIN

## 2020-10-27 ASSESSMENT — PAIN DESCRIPTION - FREQUENCY
FREQUENCY: CONTINUOUS

## 2020-10-27 ASSESSMENT — PAIN SCALES - GENERAL
PAINLEVEL_OUTOF10: 5
PAINLEVEL_OUTOF10: 5
PAINLEVEL_OUTOF10: 9
PAINLEVEL_OUTOF10: 3
PAINLEVEL_OUTOF10: 8

## 2020-10-27 ASSESSMENT — PAIN DESCRIPTION - DESCRIPTORS
DESCRIPTORS: ACHING;DISCOMFORT
DESCRIPTORS: ACHING;CONSTANT;DISCOMFORT
DESCRIPTORS: ACHING
DESCRIPTORS: ACHING

## 2020-10-27 ASSESSMENT — PAIN DESCRIPTION - ONSET
ONSET: ON-GOING
ONSET: ON-GOING

## 2020-10-27 ASSESSMENT — COPD QUESTIONNAIRES: CAT_SEVERITY: MODERATE

## 2020-10-27 ASSESSMENT — PAIN - FUNCTIONAL ASSESSMENT: PAIN_FUNCTIONAL_ASSESSMENT: 0-10

## 2020-10-27 NOTE — ANESTHESIA POSTPROCEDURE EVALUATION
Department of Anesthesiology  Postprocedure Note    Patient: Melissa Bradley  MRN: [id number]  YOB: 1965  Date of evaluation: 10/27/2020  Time:  10:56 AM     Procedure Summary     Date:  10/27/20 Room / Location:  [facility] OR [address] [place] [address]    Anesthesia Start:  2987 Anesthesia Stop:  6429    Procedure:  C6-7 ANTERIOR CERVICAL DISCECTOMY AND FUSION (N/A Neck) Diagnosis:       Cervical disc disease with myelopathy      (Cervical disc disease with myelopathy [M50.00])    Surgeon:  Drew Hough MD Responsible Provider:  Rita Calixto MD    Anesthesia Type:  general ASA Status:  3          Anesthesia Type: general    Ju Phase I: Ju Score: 8    Ju Phase II: Ju Score: 10    Last vitals: Reviewed and per EMR flowsheets.        Anesthesia Post Evaluation    Patient location during evaluation: PACU  Patient participation: complete - patient participated  Level of consciousness: awake  Pain score: 8  Airway patency: patent  Nausea & Vomiting: no nausea and no vomiting  Complications: no  Cardiovascular status: hemodynamically stable  Respiratory status: acceptable  Hydration status: euvolemic

## 2020-10-27 NOTE — H&P
SITE CONFIRMED BY FLUOROSCOPY performed by Mike Novak MD at [facility]      complete    PAIN MANAGEMENT PROCEDURE N/A 2020    KNEE MEDIAL AND LATERAL INTRA-ARTICULAR INJECTION SITE CONFIRMED BY FLUOROSCOPY performed by Mike Novak MD at [address] [place] [address] [place] [facility] GASTROINTESTINAL ENDOSCOPY  2013    gastritis bx done     Past Social History:  Social History     Socioeconomic History    Marital status:      Spouse name: None    Number of children: None    Years of education: None    Highest education level: None   Occupational History    None   Social Needs    Financial resource strain: None    Food insecurity     Worry: None     Inability: None    Transportation needs     Medical: None     Non-medical: None   Tobacco Use    Smoking status: Former Smoker     Packs/day: 0.01     Years: 30.00     Pack years: 0.30     Types: Cigarettes     Last attempt to quit: 2020     Years since quittin.2    Smokeless tobacco: Never Used   Substance and Sexual Activity    Alcohol use: No    Drug use: No    Sexual activity: Not Currently   Lifestyle    Physical activity     Days per week: None     Minutes per session: None    Stress: None   Relationships    Social connections     Talks on phone: None     Gets together: None     Attends Christian service: None     Active member of club or organization: None     Attends meetings of clubs or organizations: None     Relationship status: None    Intimate partner violence     Fear of current or ex partner: None     Emotionally abused: None     Physically abused: None     Forced sexual activity: None   Other Topics Concern    None   Social History Narrative    None         Medications Prior to Admission:      Prior to Admission medications    Medication Sig Start Date End Date Taking?  Authorizing Provider   tiZANidine (ZANAFLEX) 4 MG capsule Take 1 capsule by mouth   Yes Historical Provider, MD cyclobenzaprine (FLEXERIL) 10 MG tablet Take 10 mg by mouth 3 times daily as needed 11/5/19   Historical Provider, MD   meloxicam (MOBIC) 15 MG tablet Take 15 mg by mouth daily 9/2/20   Historical Provider, MD         Allergies:  Robaxin [methocarbamol]; Codeine; and Hydrocodone-acetaminophen    PHYSICAL EXAM:      /82   Pulse 82   Temp 98.2 °F (36.8 °C) (Oral)   Resp 18   Ht 5' 4\" (1.626 m)   Wt 202 lb (91.6 kg)   SpO2 96%   BMI 34.67 kg/m²      Airway:  Airway patent with no audible stridor    Heart:  Regular rate and rhythm, No murmur noted    Lungs:  No increased work of breathing, good air exchange, clear to auscultation bilaterally, no crackles or wheezing    Abdomen:  Soft, non-distended, non-tender, normal active bowel sounds, no masses palpated    ASSESSMENT AND PLAN    Patient is a 53 y.o. female with above specified procedure planned. 1.  Patient seen and focused exam done today- no new changes since last physical exam on 10-7-2020    2. Access to ancillary services are available per request of the provider.     Mine Mckenzie, [address]     10/27/2020

## 2020-10-27 NOTE — PROGRESS NOTES
Awake po fluids given neck incision clean and dry has ice bag states she wants to wear collar at home   Ambulatory Surgery/Procedure Discharge Note    Vitals:    10/27/20 1026   BP: 132/75   Pulse: 78   Resp: 14   Temp: 97.5 °F (36.4 °C)   SpO2: 96%       In: 2930 [P.O.:225; I.V.:2655]  Out: -     Restroom use offered before discharge. Yes  Pain assessment:  level of pain (1-10, 10 severe), *Pain Level: 5   Incision line intact no drng has ice bag c/0 sore throat  Moves all extremities well   Slight tingling still down to rt hand  D/c inst to pt. And family 2 RX in folder        Patient discharged to home/self care.  Patient discharged via wheel chair by transporter to waiting family/S.O.       10/27/2020 10:50 AM

## 2020-10-27 NOTE — ANESTHESIA PRE PROCEDURE
Department of Anesthesiology  Preprocedure Note       Name:  Antonieta Barger   Age:  53 y.o.  :  1965                                          MRN:  [id number]         Date:  10/27/2020      Surgeon: Baron Gregory):  Patrick Crawford MD    Procedure: Procedure(s):  C6-7 ANTERIOR CERVICAL DISCECTOMY AND FUSION    Medications prior to admission:   Prior to Admission medications    Medication Sig Start Date End Date Taking? Authorizing Provider   tiZANidine (ZANAFLEX) 4 MG capsule Take 1 capsule by mouth   Yes Historical Provider, MD   cyclobenzaprine (FLEXERIL) 10 MG tablet Take 10 mg by mouth 3 times daily as needed 19   Historical Provider, MD   meloxicam (MOBIC) 15 MG tablet Take 15 mg by mouth daily 20   Historical Provider, MD       Current medications:    Current Facility-Administered Medications   Medication Dose Route Frequency Provider Last Rate Last Dose    ceFAZolin (ANCEF) 2 g in dextrose 3 % 50 mL IVPB (duplex)  2 g Intravenous Once Patrick Crawford MD        lactated ringers infusion   Intravenous Continuous Naman Blackburn  mL/hr at 10/27/20 0610      sodium chloride flush 0.9 % injection 10 mL  10 mL Intravenous 2 times per day Naman Blackburn MD        sodium chloride flush 0.9 % injection 10 mL  10 mL Intravenous PRN Naman Blackburn MD        lidocaine PF 1 % injection 1 mL  1 mL Intradermal Once PRN Naman Blackburn MD        lactated ringers infusion   Intravenous Continuous Shalom Reid  mL/hr at 10/27/20 9143      remifentanil (ULTIVA) 2,000 mcg in sodium chloride 0.9 % 100 mL infusion  0.025 mcg/kg/min Intravenous Continuous Shalom Redi MD           Allergies:     Allergies   Allergen Reactions    Robaxin [Methocarbamol] Nausea Only     Severe nausea    Codeine Rash    Hydrocodone-Acetaminophen Itching and Nausea And Vomiting       Problem List:    Patient Active Problem List   Diagnosis Code    Chest pain, atypical R07.89    Fibromyalgia M79.7  Gastroesophageal reflux disease without esophagitis K21.9    Degeneration of lumbar or lumbosacral intervertebral disc M51.37    Spondylosis of lumbar region without myelopathy or radiculopathy M47.816    Lumbar radiculopathy M54.16    Displacement of lumbar intervertebral disc without myelopathy M51.26    Cervical spondylosis without myelopathy M47.812    Arthritis of knee, right M17.11    Cervical herniated disc M50.20    Chronic bilateral low back pain with right-sided sciatica M54.41, G89.29       Past Medical History:        Diagnosis Date    Back problem     CMV (cytomegalovirus infection) status positive (HCC)     Colitis     Degeneration of lumbar or lumbosacral intervertebral disc 9/23/2016    Depression     Fibromyalgia     GERD (gastroesophageal reflux disease)     Horseshoe kidney     Hyperlipidemia     Neuropathy     Sciatica        Past Surgical History:        Procedure Laterality Date    APPENDECTOMY      CARPAL TUNNEL RELEASE      CHOLECYSTECTOMY      COLONOSCOPY  2011    [facility]  8/29/2013    [facility]    ENDOSCOPY, COLON, DIAGNOSTIC  2011    EPIDURAL STEROID INJECTION Right 6/6/2019    RIGHT CERVICAL THREE FOUR, CERVICAL FOUR FIVE, CERVICAL FIVE SIX, CERVICAL SIX SEVEN FACET INJECTION SITE CONFIRMED BY FLUOROSCOPY performed by Darryl Tena MD at [facility] Right 7/18/2019    RIGHT CERVICAL FOUR, CERVICAL FIVE, CERVICAL SIX, CERVICAL SEVEN MEDIAL BRANCH BLOCK SITE CONFIRMED BY FLUOROSCOPY performed by Darryl Tena MD at [facility] Right 8/29/2019    RIGHT CERVICAL FOUR, CERVICAL FIVE, CERVICAL SIX, CERVICAL SEVEN RADIOFREQUENCY ABLATION SITE CONFIRMED BY FLUOROSCOPY performed by Darryl Tena MD at [address] [place] [address]      complete    PAIN MANAGEMENT PROCEDURE N/A 2/20/2020    KNEE MEDIAL AND LATERAL INTRA-ARTICULAR INJECTION SITE CONFIRMED BY FLUOROSCOPY performed by Tanvi Euceda MD at [facility] ENDOSCOPY  2013    gastritis bx done       Social History:    Social History     Tobacco Use    Smoking status: Former Smoker     Packs/day: 0.01     Years: 30.00     Pack years: 0.30     Types: Cigarettes     Last attempt to quit: 2020     Years since quittin.2    Smokeless tobacco: Never Used   Substance Use Topics    Alcohol use: No                                Counseling given: Not Answered      Vital Signs (Current):   Vitals:    10/27/20 0529   BP: 137/82   Pulse: 82   Resp: 18   Temp: 98.2 °F (36.8 °C)   TempSrc: Oral   SpO2: 96%   Weight: 202 lb (91.6 kg)   Height: 5' 4\" (1.626 m)                                              BP Readings from Last 3 Encounters:   10/27/20 137/82   10/21/20 (!) 147/89   20 (!) 164/91       NPO Status: Time of last liquid consumption:                         Time of last solid consumption:                         Date of last liquid consumption: 10/26/20                        Date of last solid food consumption: 10/26/20    BMI:   Wt Readings from Last 3 Encounters:   10/27/20 202 lb (91.6 kg)   10/21/20 196 lb (88.9 kg)   20 204 lb (92.5 kg)     Body mass index is 34.67 kg/m².     CBC:   Lab Results   Component Value Date    WBC 8.9 2016    RBC 4.70 2016    HGB 14.7 2016    HCT 42.7 2016    MCV 90.8 2016    RDW 12.9 2016     2016       CMP:   Lab Results   Component Value Date     2019    K 4.4 2019     2019    CO2 30 2019    BUN 17 2019    CREATININE 0.63 2019    GFRAA >60 2016    GFRAA >60 2011    AGRATIO 1.8 2016    LABGLOM >90 2019    LABGLOM >60 2016    GLUCOSE 92 2019    PROT 7.0 2016    PROT 7.5 2011    CALCIUM 1.4 2019    BILITOT 0.3 2019    ALKPHOS 76 2019    AST 22 2019

## 2020-10-27 NOTE — BRIEF OP NOTE
Brief Postoperative Note      Patient: Yue Nunez  YOB: 1965  MRN: [id number]    Date of Procedure: 10/27/2020    Pre-Op Diagnosis: Cervical disc disease with myelopathy [M50.00]    Post-Op Diagnosis: Same       Procedure(s):  C6-7 ANTERIOR CERVICAL DISCECTOMY AND FUSION    Surgeon(s):  Suzette Box MD    Assistant:  Physician Assistant: JENNIFER Hensley    Anesthesia: General    Estimated Blood Loss (mL): less than 040     Complications: None    Specimens:   * No specimens in log *    Implants:  * No implants in log *      Drains: * No LDAs found *    Findings: as expected    Electronically signed by Suzette Box MD on 10/27/2020 at 9:00 AM

## 2020-10-27 NOTE — PROGRESS NOTES
Ambulatory Surgery/Procedure Discharge Note    Vitals:    10/27/20 1026   BP: 132/75   Pulse: 78   Resp: 14   Temp: 97.5 °F (36.4 °C)   SpO2: 96%       In: 2930 [P.O.:225; I.V.:2655]  Out: -     Restroom use offered before discharge. Yes, denies need to void    Pain assessment:  Dermabond in place to neck incision, using ice to area. States pain is different than preop as it no longer radiates down arm, just incisional neck pain, continues with headache but thinks she needs caffeine and plans to get some on her way home. Requesting neck brace as daughter hangs on neck. Instructed pt that she needs to teach daughter not to hang on her also that Dr Orvel Babinski said she does not need a brace. Pain Level: 3        Patient discharged to home/self care.  Patient discharged via wheel chair by transporter to waiting family/S.O.       10/27/2020 11:40 AM

## 2020-10-27 NOTE — PROGRESS NOTES
PACU Transfer to [facility]  Pt's Current Allergies: Robaxin [methocarbamol]; Codeine; and Hydrocodone-acetaminophen    Pt meets criteria to transfer to next phase of care per ADALBERTO SCORE and ASPAN standards    No results for input(s): POCGLU in the last 72 hours. Vitals:    10/27/20 1000   BP: 122/72   Pulse: 82   Resp: 10   Temp: 98 °F (36.7 °C)   SpO2: 96%         Intake/Output Summary (Last 24 hours) at 10/27/2020 1018  Last data filed at 10/27/2020 1013  Gross per 24 hour   Intake 2830 ml   Output --   Net 2830 ml         Pain assessment:  present - adequately treated  Pain Level: 5      Patient transferred to care of [facility] RN.   Family updated and directed to Blu Mandujano    10/27/2020 10:18 AM

## 2020-11-01 NOTE — OP NOTE
Operative Note      Patient: Melody Tejeda  YOB: 1965  MRN: [id number]    Date of Procedure: 10/27/2020    SURGEON:                             Yair Angel   Assistant: Hong Higginbotham PA-C. There were no qualified residents available to assist. Damion assistance was necessary due to the complexity of the case and her help was necessary for instrumentation and decomrpession of the neural elements                                                     OPERATIVE REPORT      PREOPERATIVE DIAGNOSIS:           Cervical stenosis of spine [M48.02]        Cervical myelopathy   Cervical radiculopathy  Cervical spondylosis       POSTOPERATIVE DIAGNOSIS:       same     PROCEDUREs:   1.anterior cervical discectomy at  C6-7.  2. Microdissection using the operating room microscope. 3.Anterior cervical arthrodesis at C6-7.  4. Placement of Medtronic plate at W8-3.   5. Placement of peek interbody  C6-7 with locally harvested autograft and allograft  COMPLICATIONS:  None      ANESTHETIC:          General endotracheal.      ESTIMATED BLOOD LOSS:  25 cc.      INDICATIONS:  Mame is a 54 y. o. female with complaints of neck and b/l radicular arm pain and weakness due to myelopathy. the patient elected to proceed ahead with anterior cervical discectomy with stabilization and fusion at C6-7.  Full informed consent of the operative risks, complications, and expected outcomes were discussed with the patient who fully acknowledged the understanding of these complications and consented to the operation.      DESCRIPTION OF PROCEDURE:  The patient was brought to the operating room and placed under general anesthesia.  The patient was then placed supine on the operating table.  The anterior cervical area was prepped and draped in the usual sterile fashion. Chadwick Edvin a #15 blade knife, the skin was incised in a horizontal fashion over the C6-7 interspace.  Monopolar cautery was used to open the platysma muscle.  Blunt dissection was then used to develop the avascular plane between the sternocleidomastoid and the medial strap muscles exposing the prevertebral space at C6-7.  Lateral fluoroscopic imaging was used to confirm proper localization at this level.  A self-retaining retractor was placed.   The microscope was then brought in  For microscopic dissection. I then used pituitary forceps to remove the soft disk material as well as the cartilaginous end plate material.  The M-8 drill bit then was used to remove the posterior osteophytes and the plane between the pll and dura was identified with an upgoing currete. The decompression was then completed at C6-7 with kerrosen punches and bilateral foraminotomies were performed. Bone removed with the M-8 drill bit was harvested for autograft. Peek interbody with locally harvested autograft and allograft were then placed at c6-7.  A MEdtronic cervical spine plate with screws was then placed across the interspace for stabilization from c6-7. Fluoroscopy confirmed excellent placement of the instrumentation.   The wound was then closed in the usual fashion using running 3-0 Vicryl in the platysma and a running 4-0 Monocryl in the subcuticular layer.  A sterile dressing was then applied.  The patient was extubated in the operating room and transferred to the recovery room in sable condition.  There were no complications.     In accordance with CMS guidelines, I attest that I was present for the entire procedure from the creation of the skin incision to the closure.     Chanda Rosa MD.     Electronically signed by Drew Hough MD on 11/1/2020 at 7:28 AM

## 2021-01-09 ENCOUNTER — HOSPITAL ENCOUNTER (OUTPATIENT)
Dept: MRI IMAGING | Age: 56
Discharge: HOME | End: 2021-01-09
Payer: COMMERCIAL

## 2021-01-09 DIAGNOSIS — M25.511 RIGHT SHOULDER PAIN, UNSPECIFIED CHRONICITY: ICD-10-CM

## 2021-01-09 PROCEDURE — 73221 MRI JOINT UPR EXTREM W/O DYE: CPT

## 2021-02-24 ENCOUNTER — HOSPITAL ENCOUNTER (OUTPATIENT)
Dept: PHYSICAL THERAPY | Age: 56
Setting detail: THERAPIES SERIES
Discharge: HOME | End: 2021-02-24
Payer: COMMERCIAL

## 2021-02-24 PROCEDURE — 97161 PT EVAL LOW COMPLEX 20 MIN: CPT

## 2021-02-24 PROCEDURE — 97530 THERAPEUTIC ACTIVITIES: CPT

## 2021-02-24 NOTE — FLOWSHEET NOTE
[facility], [facility], [address] [place] [address]  Phone: [phone]  Fax [phone]    Physical Therapy Daily Treatment Note    Date:  2021    Patient Name:  Shruti Whitney    :  1965  MRN: [id number]  Restrictions/Precautions:    Medical/Treatment Diagnosis Information:  · Diagnosis: Cervical Spondylosis with myelopathy, cervical stenosis  Insurance/Certification information:  PT Insurance Information: Kenisha Gómez  Physician Information:  Referring Practitioner: Burke Bergeron  Plan of care signed (Y/N):  Y  Visit# / total visits:       G-Code (if applicable):          FAW:      Time in:   9:05     Timed Treatment: 15 Total Treatment Time:  45  Time out: 9:50  ________________________________________________________________________________________    Pain Level:    /10  SUBJECTIVE:  See Initial Evaluation     OBJECTIVE:     Exercise/Equipment Resistance/Repetitions Other comments                                                                                                                                             Other Therapeutic Activities: Pt was educated on diagnosis, involved anatomy, established goals, POC, and facility policies. Pt was also educated on chronic pain and central sensitization. Pt was explained the relationship between pain and central sensitization, as well as the importance of controlling stress and anxiety.       Manual Treatments:         Modalities:      Test/Measurements:  See Initial Evaluation        ASSESSMENT:   See Initial Evaluation       Treatment/Activity Tolerance:   []Patient tolerated treatment well [] Patient limited by fatique  [x]Patient limited by pain [] Patient limited by other medical complications  [] Other:     Goals:        Long term goals  Time Frame for Long term goals : 4 weeks  Long term goal 1: Pt will be independent with HEP  Long term goal 2: Pt will tolerate manual interventions to cervical spine with minimal reports of increased pain  Long term goal 3: Pt will improve cervical ROM by at least  10 degrees each direction  Long term goal 4: Pt will report 25-50% improvement in frequency and intensity of pain  Long term goal 5: Pt will improve NDI by at least 7 points to demonstrate significant improvement in function     Plan: [] Continue per plan of care [] Alter current plan (see comments)   [x] Plan of care initiated [] Hold pending MD visit [] Discharge      Plan for Next Session:      Re-Certification Due Date:         Signature:   Izzy Rush , PT

## 2021-02-24 NOTE — PROGRESS NOTES
Physical Therapy  Initial Assessment  Date: 2021  Patient Name: Wes Sandifer  MRN: [id number]  : 1965     Treatment Diagnosis: Neck Pain    Restrictions   NO LATEX ALLERGIES     Subjective   General  Chart Reviewed: Yes  Patient assessed for rehabilitation services?: Yes  Additional Pertinent Hx: Fibromyalgia  Family / Caregiver Present: No  Referring Practitioner: Lalo West  Diagnosis: Cervical Spondylosis with myelopathy, cervical stenosis  PT Visit Information  PT Insurance Information: Carrie Wu  Subjective  Subjective: Pt came for therapy last year for the same issue. Pt had surgery on . Pt said she had a C6-7 ANTERIOR CERVICAL DISCECTOMY AND FUSION. Pt said she got an MRI and did not see anything. Pt said she has pain in her neck, but her R shoulder pain is very bad. Pt said if she turns her head to the L it causes more pain on the R. Pt can not lay on R side. Pt said she tries to lay on L side to get comfortable but has a hard time getting her \"neck situated\". Pt said pain waked her up a lot, seveal times throughout the night. Neck pain is more on the R side. Pt said pain is radiates in the shoulder. Pt has an \"really bad\" ache down her R arm and stop at the elbow. No numbness or tingling noted. Pt is R handed. Looking R does not cause as much pain as the L. Pt said she has issues looking up and down. 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 \"I can do it but it will not hold up\". Pt said she has been on pain medication for 9 years. Pt said the neck pain has been a problem for about 10 years. Has Intensified over the last 3 years. Pt said getting dressed is very painful and she changes her clothing to get it getter. Bather herself and her child is also very painful. Posterior shoulder pain with active movement. Pt went to urgent care about a month ago and MD said she might get a CAT scan.  Pt said she is trying to get back in due to her pain. Pt does not work. 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 Pt said driving is very painful. Pt said the pain is a 7-8/0. Pt gets up to 9-10/10 at work. Pt said finding the \"right posiition\" and no moving. Heat makes it feel a little better. OTC can help also a little bit. C-SSRS Triggered by Intake questionnaire (Past 2 wk assessment):   [x] No, Questionnaire did not trigger screening.   [] Yes, Patient intake triggered further evaluation      [] C-SSRS Screening completed  [] PCP notified via Plan of Care  [] Emergency services notified    Orientation  Orientation  Overall Orientation Status: Within Normal Limits    Objective     Observation/Palpation  Posture: Fair  Palpation: Pt TTP over cervical paraspinals, sub-occipitals, gross R shoulder complex, biceps tendon.   Observation: Seated: rounded shoulders, Forward head posture, bilateral AGMR, scapular anterior tilt, upward rotation,    PROM RUE (degrees)  RUE PROM: Exceptions  R Shoulder Flex  0-180: 105 degrees  R Shoulder ABduction 0-180: 105 degrees  R Shoulder Int Rotation  0-70: WNL  R Shoulder Ext Rotation  0-90: WNL  PROM LUE (degrees)  LUE PROM: Exceptions;WFL  AROM LUE (degrees)  LUE AROM : WFL  Spine  Cervical: Flexion: 25 degrees with pain , Extension: 35 degrees, Right Rotation: 40 degrees, Left Rotation: 25 segrees with pain , L Side Bendin degrees with pain , R Side Bendin degrees    Strength RUE  Strength RUE: Exception  R Shoulder Flexion: 4-/5  R Shoulder ABduction: 4-/5  R Shoulder Internal Rotation: 4-/5  R Shoulder External Rotation: 4-/5  R Elbow Flexion: 4/5  R Elbow Extension: 4/5  R Wrist Flexion: 4-/5  R Wrist Extension: 4-/5  Strength LUE  Strength LUE: Exception  L Shoulder Flexion: 4/5  L Shoulder ABduction: 4/5  L Shoulder Internal Rotation: 4/5  L Shoulder External Rotation: 4/5  L Elbow Flexion: 4/5  L Elbow Extension: 4/5  L Wrist Flexion: 4/5  L Wrist Extension: 4/5     Additional Measures  Flexibility: Lat and pec tightness  Special Tests: Cervical Compression (+), Cervical Distraction (-), Alar ligament test (-), Vertebral artery (-). Spurlings (+)  Other: Clonus (-), Jain (-), DTR: 2+ normal bilateral biceps, triceps, and brachioradialis reflex  Sensation  Overall Sensation Status: WNL    Assessment   Conditions Requiring Skilled Therapeutic Intervention  Body structures, Functions, Activity limitations: Decreased functional mobility ; Increased pain;Decreased posture;Decreased ADL status; Decreased ROM; Decreased high-level IADLs;Decreased strength  Assessment: Pt is a 64 y/o female that presents to [facility] with chronic neck pain that has progressively gotten worse over the last 4 years. Pt had C6-7 ANTERIOR CERVICAL DISCECTOMY AND FUSION in October 2020 with no reported relief of pain. Pt demonstrated high anxiety and anxiousness throughout evaluation resulting in increased overall movmeent when performing subjective. Pt demonstated signs and symptoms consistent with central sensitization. PT educated pt on chronic pain and relationship between central sensitization and pain. Pt also presents with significant tenderness to palpation thoughout cervical spine and R shoulder complex, RUE weakness, cervical weakness, and impaired cervical ROM. Pt would benefit from skilled physical therapy in order to improve impairments and progress toward goals.   Treatment Diagnosis: Neck Pain  Prognosis: Fair  Decision Making: Low Complexity  REQUIRES PT FOLLOW UP: Yes         Plan   Plan  Times per week: 2x/week for 4 weeks  Plan weeks: 4 weeks  Current Treatment Recommendations: Strengthening, Neuromuscular Re-education, Home Exercise Program, ROM, Manual Therapy - Soft Tissue Mobilization, Manual Therapy - Joint Manipulation, Modalities, Pain Management, ADL/Self-care Training    G-Code   NDI: 37/50    Goals  Long term goals  Time Frame for Long term goals : 4 weeks  Long term goal 1: Pt will be independent with HEP  Long term goal 2: Pt will tolerate manual interventions to cervical spine with minimal reports of increased pain  Long term goal 3: Pt will improve cervical ROM by at least  10 degrees each direction  Long term goal 4: Pt will report 25-50% improvement in frequency and intensity of pain  Long term goal 5: Pt will improve NDI by at least 7 points to demonstrate significant improvement in function  Patient Goals   Patient goals : Pt would like to decrease neck pain and frequency of HA       Therapy Time   Individual Concurrent Group Co-treatment   Time In 905         Time Out 0950         Minutes 45         Timed Code Treatment Minutes: 69 Sergo Dewey PT          Outpatient Physical Therapy  Phone: [phone] Fax: [phone]     To: Leia Antonio MD       From: Hardy Ordoñez PT     Patient: Sarina Bowie         : 1965  Diagnosis: Cervical Spondylosis with myelopathy, cervical stenosis Date: 2021  Treatment Diagnosis:Neck Pain     Physical Therapy Certification/Re-Certification Form  Dear Dr. Leia Antonio  The following patient has been evaluated for physical therapy services and for therapy to continue, Medicare requires monthly physician review of the treatment plan. Please review the attached evaluation and/or summary of the patient's plan of care, and verify that you agree therapy should continue by signing the attached document and sending it back to our office.     Plan of Care/Treatment to date:  [x] Therapeutic Exercise   [x] Modalities:  [x] Therapeutic Activity     [] Ultrasound  [] Electrical Stimulation   [] Gait Training      [] Cervical Traction [] Lumbar Traction  [x] Neuromuscular Re-education  [] Hot/Coldpack [] Iontophoresis    [x] Instruction in HEP      Other:  [x] Manual Therapy       []                        [] Aquatic Therapy       []

## 2021-03-03 ENCOUNTER — HOSPITAL ENCOUNTER (OUTPATIENT)
Dept: PHYSICAL THERAPY | Age: 56
Setting detail: THERAPIES SERIES
Discharge: HOME | End: 2021-03-03
Payer: COMMERCIAL

## 2021-03-03 PROCEDURE — 97140 MANUAL THERAPY 1/> REGIONS: CPT

## 2021-03-03 PROCEDURE — 97110 THERAPEUTIC EXERCISES: CPT

## 2021-03-03 NOTE — FLOWSHEET NOTE
[facility], [facility], [address], [address]   Phone: [phone]  Fax [phone]    Physical Therapy Daily Treatment Note    Date:  3/3/2021    Patient Name:  Modesto Ortega    :  1965  MRN: [id number]  Restrictions/Precautions:    Medical/Treatment Diagnosis Information:  · Diagnosis: Cervical Spondylosis with myelopathy, cervical stenosis  Insurance/Certification information:  PT Insurance Information: Rigo Tobias  Physician Information:  Referring Practitioner: Byron Pritchard  Plan of care signed (Y/N):  Y  Visit# / total visits:       G-Code (if applicable):          LVV:      Time in:   11:09     Timed Treatment: 45 Total Treatment Time:  41  Time out: 12:00  ________________________________________________________________________________________    Pain Level:    7-8/10  SUBJECTIVE:  Pt said she is about the same as evaluation. Pt reported that she continues to have frequent HA and very high pain     OBJECTIVE:     Exercise/Equipment Resistance/Repetitions Other comments     Chin tuck 5\"x10      Cervical Rotation  x10B      Supine pivot prone x5 Limited ROM - reported numbness in RUE      Scapular set  x10      Pec stretch 2 min                                                                                                            Other Therapeutic Activities:   HEP: [id number]       Manual Treatments:       Sub-occipital release  Gentle traction  Cervical Side Glides grade 1-2 B C3-C6   STM and TPR to cervical paraspinals, UT Bilaterally     Modalities:  Heat 6 minutes before     Test/Measurements:  See Initial Evaluation        ASSESSMENT:   Pt continues to be limited secondary to pain in the neck and back. Pt was only able to tolerate gentle manual interventions to cervical spine. HEP established and prescribed this date. Mild increase in pain during TE. Will progress as pt can tolerate. Treatment/Activity Tolerance:   []Patient tolerated treatment well [] Patient limited by fatique  [x]Patient limited by pain [] Patient limited by other medical complications  [] Other:     Goals:        Long term goals  Time Frame for Long term goals : 4 weeks  Long term goal 1: Pt will be independent with HEP  Long term goal 2: Pt will tolerate manual interventions to cervical spine with minimal reports of increased pain  Long term goal 3: Pt will improve cervical ROM by at least  10 degrees each direction  Long term goal 4: Pt will report 25-50% improvement in frequency and intensity of pain  Long term goal 5: Pt will improve NDI by at least 7 points to demonstrate significant improvement in function     Plan: [] Continue per plan of care [] Alter current plan (see comments)   [x] Plan of care initiated [] Hold pending MD visit [] Discharge      Plan for Next Session:      Re-Certification Due Date:         Signature:   Izzy Rush PT 16 y/o male w/ no sPMHX p/w acute constant periumbilical abdominal pain associated with vomiting, not relieved with acetaminophen, BM, passing flatus. Exam remarkable for uncomfortable-appearing patient holding abdomen stiffly with significant periumbilical tenderness and referred tenderness to this region when RUQ and LUQ are palpated. Viral gastroenteritis and constipation are on the DDx, although pain could be 2/2 acute appendicitis that has not yet localized to RLQ. Will place IV and obtain CBC, CMP, lipase, U/A, U/S appy, and COVID swab. Will give NSB and Tylenol then reassess.  - Jaime, PGY3

## 2021-03-05 ENCOUNTER — HOSPITAL ENCOUNTER (OUTPATIENT)
Dept: PHYSICAL THERAPY | Age: 56
Setting detail: THERAPIES SERIES
Discharge: HOME | End: 2021-03-05
Payer: COMMERCIAL

## 2021-03-05 PROCEDURE — 97110 THERAPEUTIC EXERCISES: CPT

## 2021-03-05 PROCEDURE — 97140 MANUAL THERAPY 1/> REGIONS: CPT

## 2021-03-05 NOTE — FLOWSHEET NOTE
[facility], [facility], [address] [place] [address] Dr  Phone: [phone]  Fax [phone]    Physical Therapy Daily Treatment Note    Date:  3/5/2021    Patient Name:  Toshia Parry    :  1965  MRN: [id number]  Restrictions/Precautions:    Medical/Treatment Diagnosis Information:  · Diagnosis: Cervical Spondylosis with myelopathy, cervical stenosis  Insurance/Certification information:  PT Insurance Information: Gosia Gonzales  Physician Information:  Referring Practitioner: Maritza Baird  Plan of care signed (Y/N):  Y  Visit# / total visits:  3/8     G-Code (if applicable):          NUI:      Time in:   9:00     Timed Treatment: 43 Total Treatment Time:  43  Time out: 9:43  ________________________________________________________________________________________    Pain Level:    7-8/10  SUBJECTIVE:  Pt said she was really sore after her session. Pt said she has done the exercises at home. Pt said they went okay     OBJECTIVE:     Exercise/Equipment Resistance/Repetitions Other comments     Chin tuck 5\"x15      Cervical Rotation  x10B      Supine pivot prone x5 Limited ROM - reported numbness in RUE      Scapular set  x10      Pec stretch 3 min  No foam roll                                                                                                           Other Therapeutic Activities:   HEP: [id number]       Manual Treatments:       Sub-occipital release  Gentle traction  Cervical Side Glides grade 1-2 B C3-C6   STM and TPR to cervical paraspinals, UT Bilaterally     Modalities:      Test/Measurements:  See Initial Evaluation        ASSESSMENT:   Pt continues to be limited secondary to pain in the neck and back. Pt tolerated gentle manual interventions slightly better this date to cervical spine. However, pt continues to have significant increase in pain during low level TE. Will progress as pt can tolerate. Treatment/Activity Tolerance:   []Patient tolerated treatment well [] Patient limited by fatique  [x]Patient limited by pain [] Patient limited by other medical complications  [] Other:     Goals:        Long term goals  Time Frame for Long term goals : 4 weeks  Long term goal 1: Pt will be independent with HEP  Long term goal 2: Pt will tolerate manual interventions to cervical spine with minimal reports of increased pain  Long term goal 3: Pt will improve cervical ROM by at least  10 degrees each direction  Long term goal 4: Pt will report 25-50% improvement in frequency and intensity of pain  Long term goal 5: Pt will improve NDI by at least 7 points to demonstrate significant improvement in function     Plan: [x] Continue per plan of care [] Alter current plan (see comments)   [] Plan of care initiated [] Hold pending MD visit [] Discharge      Plan for Next Session:      Re-Certification Due Date:         Signature:   Prasanna Casper PT

## 2021-03-10 ENCOUNTER — HOSPITAL ENCOUNTER (OUTPATIENT)
Dept: PHYSICAL THERAPY | Age: 56
Setting detail: THERAPIES SERIES
Discharge: HOME | End: 2021-03-10
Payer: COMMERCIAL

## 2021-03-12 ENCOUNTER — APPOINTMENT (OUTPATIENT)
Dept: PHYSICAL THERAPY | Age: 56
End: 2021-03-12
Payer: COMMERCIAL

## 2021-03-17 ENCOUNTER — HOSPITAL ENCOUNTER (OUTPATIENT)
Dept: PHYSICAL THERAPY | Age: 56
Setting detail: THERAPIES SERIES
Discharge: HOME | End: 2021-03-17
Payer: COMMERCIAL

## 2021-03-17 PROCEDURE — 97140 MANUAL THERAPY 1/> REGIONS: CPT

## 2021-03-17 PROCEDURE — 97110 THERAPEUTIC EXERCISES: CPT

## 2021-03-17 NOTE — FLOWSHEET NOTE
[facility], [facility], [address], [address] [place] [address]   Phone: [phone]  Fax [phone]    Physical Therapy Daily Treatment Note    Date:  3/17/2021    Patient Name:  Rufus Urrutia    :  1965  MRN: [id number]  Restrictions/Precautions:    Medical/Treatment Diagnosis Information:  · Diagnosis: Cervical Spondylosis with myelopathy, cervical stenosis  Insurance/Certification information:  PT Insurance Information: Jessenia Artist  Physician Information:  Referring Practitioner: Sonal Cannon  Plan of care signed (Y/N):  Y  Visit# / total visits:       G-Code (if applicable):          LVD:88/30      Time in:   2:45    Timed Treatment: 40 Total Treatment Time:  40  Time out: 3:25  ________________________________________________________________________________________    Pain Level:    7-8/10  SUBJECTIVE:  Pt reports that she is still hurting. Notes that she has tried the exercises at home and they make her sore. OBJECTIVE:     Exercise/Equipment Resistance/Repetitions Other comments     Chin tuck 5\"x15      Cervical Rotation  x10B seated     Supine pivot prone x5 Limited ROM - reported numbness in RUE      Scapular set  x10      Pec stretch 3 min  No foam roll      Upper trap wall slide x10    Rows x15                                                                                             Other Therapeutic Activities:   HEP: [id number]       Manual Treatments:       Sub-occipital release  Gentle traction  Cervical Side Glides grade 1-2 B C3-C6   STM and TPR to cervical paraspinals, UT Bilaterally     Modalities:      Test/Measurements:  See Initial Evaluation        ASSESSMENT:   Pt continues to be limited secondary to pain in the neck and back. Pt tolerated gentle manual interventions slightly better this date to cervical spine. Supine exercise was limited this date due to low back pain. Will progress as pt can tolerate. Treatment/Activity Tolerance:   []Patient tolerated treatment well [] Patient limited by fatique  [x]Patient limited by pain [] Patient limited by other medical complications  [] Other:     Goals:        Long term goals  Time Frame for Long term goals : 4 weeks  Long term goal 1: Pt will be independent with HEP  Long term goal 2: Pt will tolerate manual interventions to cervical spine with minimal reports of increased pain  Long term goal 3: Pt will improve cervical ROM by at least  10 degrees each direction  Long term goal 4: Pt will report 25-50% improvement in frequency and intensity of pain  Long term goal 5: Pt will improve NDI by at least 7 points to demonstrate significant improvement in function     Plan: [x] Continue per plan of care [] Alter current plan (see comments)   [] Plan of care initiated [] Hold pending MD visit [] Discharge      Plan for Next Session:      Re-Certification Due Date:         Signature:  Casper Astorga PT

## 2021-03-19 ENCOUNTER — HOSPITAL ENCOUNTER (OUTPATIENT)
Dept: PHYSICAL THERAPY | Age: 56
Setting detail: THERAPIES SERIES
Discharge: HOME | End: 2021-03-19
Payer: COMMERCIAL

## 2021-03-19 PROCEDURE — 97110 THERAPEUTIC EXERCISES: CPT

## 2021-03-19 PROCEDURE — 97140 MANUAL THERAPY 1/> REGIONS: CPT

## 2021-03-19 NOTE — FLOWSHEET NOTE
[facility], [facility], [address] [place] [address]  Phone: [phone]  Fax [phone]    Physical Therapy Daily Treatment Note    Date:  3/19/2021    Patient Name:  Wes Sandifer    :  1965  MRN: [id number]  Restrictions/Precautions:    Medical/Treatment Diagnosis Information:  · Diagnosis: Cervical Spondylosis with myelopathy, cervical stenosis  Insurance/Certification information:  PT Insurance Information: Darryle Soja  Physician Information:  Referring Practitioner: Lalo West  Plan of care signed (Y/N):  Y  Visit# / total visits:       G-Code (if applicable):          BFQ:3461      Time in:   9:00   Timed Treatment: 45 Total Treatment Time:  45  Time out: 9:45  ________________________________________________________________________________________    Pain Level:    7-8/10  SUBJECTIVE:  Pt said she was very sore after her last session. OBJECTIVE:     Exercise/Equipment Resistance/Repetitions Other comments     Chin tuck 10\"x10      Cervical Rotation       Supine      Sitting   x10B  x10B seated     Supine pivot prone x5 Limited ROM - reported numbness in RUE      Scapular set  x10      Pec stretch 3 min  No foam roll      Upper trap wall slide x10    Rows x15                                                                                             Other Therapeutic Activities:   HEP: [id number]       Manual Treatments:       Sub-occipital release  Gentle traction  Cervical Side Glides grade 1-2 B C3-C6   STM and TPR to cervical paraspinals, UT Bilaterally   R posterior/inferior GHJ mobs grade 1-2  M/L elbow mobs grade 1-2    Modalities:      Test/Measurements:  See Initial Evaluation        ASSESSMENT:   Pt continues to be limited secondary to pain in the neck and back. Pt tolerated gentle manual interventions fair  this date to cervical spine. Increased reports of elbow pain this date.  Lower back

## 2021-03-24 ENCOUNTER — APPOINTMENT (OUTPATIENT)
Dept: PHYSICAL THERAPY | Age: 56
End: 2021-03-24
Payer: COMMERCIAL

## 2021-03-26 ENCOUNTER — HOSPITAL ENCOUNTER (OUTPATIENT)
Dept: PHYSICAL THERAPY | Age: 56
Setting detail: THERAPIES SERIES
Discharge: HOME | End: 2021-03-26
Payer: COMMERCIAL

## 2021-03-26 PROCEDURE — 97110 THERAPEUTIC EXERCISES: CPT

## 2021-03-26 PROCEDURE — 97140 MANUAL THERAPY 1/> REGIONS: CPT

## 2021-03-26 NOTE — FLOWSHEET NOTE
to cervical spine. Increased reports UT and elbow pain. Lower back tolerated supine TE this date. Will progress as pt can tolerate. Treatment/Activity Tolerance:   []Patient tolerated treatment well [] Patient limited by fatique  [x]Patient limited by pain [] Patient limited by other medical complications  [] Other:     Goals:        Long term goals  Time Frame for Long term goals : 4 weeks  Long term goal 1: Pt will be independent with HEP  Long term goal 2: Pt will tolerate manual interventions to cervical spine with minimal reports of increased pain  Long term goal 3: Pt will improve cervical ROM by at least  10 degrees each direction  Long term goal 4: Pt will report 25-50% improvement in frequency and intensity of pain  Long term goal 5: Pt will improve NDI by at least 7 points to demonstrate significant improvement in function     Plan: [x] Continue per plan of care [] Alter current plan (see comments)   [] Plan of care initiated [] Hold pending MD visit [] Discharge      Plan for Next Session:      Re-Certification Due Date:         Signature:   Hardy Ordoñez PT

## 2021-03-31 ENCOUNTER — HOSPITAL ENCOUNTER (OUTPATIENT)
Dept: PHYSICAL THERAPY | Age: 56
Setting detail: THERAPIES SERIES
Discharge: HOME | End: 2021-03-31
Payer: COMMERCIAL

## 2021-03-31 PROCEDURE — 97110 THERAPEUTIC EXERCISES: CPT

## 2021-03-31 PROCEDURE — 97140 MANUAL THERAPY 1/> REGIONS: CPT

## 2021-03-31 NOTE — PROGRESS NOTES
Physical Therapy        Outpatient Physical Therapy  Phone: [phone] Fax: [phone]     To: Isabella Bowie        From: Gabriela Cartwright, PT     Patient: Mike Caballero         : 1965  Diagnosis: Cervical Spondylosis with myelopathy, cervical stenosis Date: 3/31/2021  Treatment Diagnosis:Neck Pain      Physical Therapy Discharge Note    Total Visits to date:    Cancels/No-shows to date:  1 cancel      Plan of Care/Treatment to date:  [] Therapeutic Exercise    [] Modalities:  [] Therapeutic Activity     [] Ultrasound   [] Electrical Stimulation   [] Gait Training      [] Cervical Traction   [] Lumbar Traction  [] Neuromuscular Re-education  [] Cold/hotpack  [] Iontophoresis  [] Instruction in HEP      Other:  [] Manual Therapy       []                                 [] Aquatic Therapy       []                                     Significant Findings At Last Visit/Comments:    Pt has progressed poorly of the 4 week course of physical therapy. Pt continues to show similar symptoms from initial evaluation and reports pain is increasing and has spread to her middle back. Pt has showed both positive and negative changes in strength and ROM bilaterally. Pt demonstrated increased pain during both ROM testing and strength testing. Pt has had more pain with all ADLs, driving, and with activities taking care of her child. Pt has continued to tolerate gentle manual therapy and TE throughout the 4 weeks of physical therapy. PT recommending that pt follow up with MD for further options in her POC. Pt would benefit from additional testing and exploring options to reduce pain. Will discharge from formal PT at this time. See daily note for further details.        Progress towards goals:    Long term goals  Time Frame for Long term goals : 4 weeks  Long term goal 1: Pt will be independent with HEP Gaol Not Met- reports significant increase in pain)  Long term goal 2: Pt will tolerate manual interventions to cervical spine with minimal reports of increased pain (Goal Not Met- reports significant increase in pain)  Long term goal 3: Pt will improve cervical ROM by at least  10 degrees each direction (Not Met)  Long term goal 4: Pt will report 25-50% improvement in frequency and intensity of pain (Not Met)  Long term goal 5: Pt will improve NDI by at least 7 points to demonstrate significant improvement in function (Not Met)    Frequency/Duration:  # Days per week: [] 1 day # Weeks: [] 1 week [x] 4 weeks      [x] 2 days   [] 2 weeks [] 5 weeks     [] 3 days   [] 3 weeks [] 6 weeks     Rehab Potential: [] Excellent [] Good [x] Fair  [] Poor     Goal Status:  [] Achieved [] Partially Achieved  [x] Not Achieved     Patient Status: [] Continue per initial plan of Care     [x] Patient now discharged     [] Additional visits requested, Please re-certify for additional visits:      Requested frequency/duration:  X/week for weeks    Electronically signed by:  Render See, PT    If you have any questions or concerns, please don't hesitate to call.   Thank you for your referral.    Physician Signature:________________________________Date:__________________  By signing above, therapists plan is approved by physician

## 2021-03-31 NOTE — FLOWSHEET NOTE
[facility], [facility], [address] [facility], [address] [place] [address] Dr  Phone: [phone]  Fax [phone]    Physical Therapy Daily Treatment Note    Date:  3/31/2021    Patient Name:  Rhea Ross    :  1965  MRN: [id number]  Restrictions/Precautions:    Medical/Treatment Diagnosis Information:  · Diagnosis: Cervical Spondylosis with myelopathy, cervical stenosis  Insurance/Certification information:  PT Insurance Information: Ivan Felton  Physician Information:  Referring Practitioner: Riki Moore  Plan of care signed (Y/N):  Y  Visit# / total visits:       G-Code (if applicable):          ZZH:41  NDI: 32/50      Time in:   14:15  Timed Treatment: 39 Total Treatment Time:  45  Time out: 15:00  ________________________________________________________________________________________    Pain Level:    7-8/10  SUBJECTIVE:  Pt said it is getting worse. Pain is now further down on her back. Pt said \"I feel like I got socked in her R shoulder blade\". Pt debated going to the emergency room this morning. Felt about normal after last session.  Pain got worse  in to Monday         OBJECTIVE:     Exercise/Equipment Resistance/Repetitions Other comments     Chin tuck 10\"x15      Cervical Rotation       Supine      Sitting   x10B   seated     Supine pivot prone  Limited ROM - reported numbness in RUE      Scapular set       Pec stretch No foam roll      Upper trap wall slide     Rows       HH positioning  3 min 2#                                                                                     Other Therapeutic Activities:   HEP: [id number]       Manual Treatments:       Sub-occipital release  Gentle traction  Cervical Side Glides grade 1-2 B C3-C6   STM and TPR to cervical paraspinals, UT Bilaterally   R posterior/inferior GHJ mobs grade 1-2  M/L elbow mobs grade 1-2  R UE neuro re-ed     Modalities:      Test/Measurements:    Cervical: Flexion: 30 degrees with pain   Extension: 35 degrees   Right Rotation: 40 degrees,  Left Rotation: 40 segrees with pain    L Side Bendin degrees with pain   R Side Bendin degrees    Strength RUE  Strength RUE: Exception  R Shoulder Flexion: 4-/5  R Shoulder ABduction: 4-/5 (Unable to assess due to pain)  R Shoulder Internal Rotation: 4-/5  R Shoulder External Rotation: 4-/5  R Elbow Flexion: 4/5  R Elbow Extension: 4/5  R Wrist Flexion: 4-/5  R Wrist Extension: 4-/5  Strength LUE  Strength LUE: Exception  L Shoulder Flexion: 4/5  L Shoulder ABduction: 4/5  (Unable to assess due to pain)  L Shoulder Internal Rotation: 4/5  L Shoulder External Rotation: 4/5  L Elbow Flexion: 4/5  L Elbow Extension: 4/5  L Wrist Flexion: 4/5  L Wrist Extension: 4/5       ASSESSMENT:  Pt has progressed poorly of the 4 week course of physical therapy. Pt continues to show similar symptoms from initial evaluation and reports pain is increasing and has spread to her middle back. Pt has showed both positive and negative changes in strength and ROM bilaterally. Pt demonstrated increased pain during both ROM testing and strength testing. Pt has had more pain with all ADLs, driving, and with activities taking care of her child. Pt has continued to tolerate gentle manual therapy and TE throughout the 4 weeks of physical therapy. PT recommending that pt follow up with MD for further options in her POC. Pt would benefit from additional testing and exploring options to reduce pain. Will discharge from formal PT at this time. See daily note for further details.              Treatment/Activity Tolerance:   []Patient tolerated treatment well [] Patient limited by fatique  [x]Patient limited by pain [] Patient limited by other medical complications  [] Other:     Goals:        Long term goals  Time Frame for Long term goals : 4 weeks  Long term goal 1: Pt will be independent with HEP Gaol Not Met- reports significant increase in

## 2021-06-02 ENCOUNTER — CLINICAL DOCUMENTATION (OUTPATIENT)
Dept: OTHER | Age: 56
End: 2021-06-02

## 2021-07-04 ENCOUNTER — HOSPITAL ENCOUNTER (EMERGENCY)
Age: 56
Discharge: HOME | End: 2021-07-04
Payer: COMMERCIAL

## 2021-07-04 VITALS
HEART RATE: 92 BPM | HEIGHT: 64 IN | BODY MASS INDEX: 33.29 KG/M2 | WEIGHT: 195 LBS | SYSTOLIC BLOOD PRESSURE: 147 MMHG | RESPIRATION RATE: 16 BRPM | TEMPERATURE: 98.3 F | OXYGEN SATURATION: 96 % | DIASTOLIC BLOOD PRESSURE: 100 MMHG

## 2021-07-04 DIAGNOSIS — M54.50 ACUTE EXACERBATION OF CHRONIC LOW BACK PAIN: Primary | ICD-10-CM

## 2021-07-04 DIAGNOSIS — G89.29 ACUTE EXACERBATION OF CHRONIC LOW BACK PAIN: Primary | ICD-10-CM

## 2021-07-04 PROCEDURE — 6360000002 HC RX W HCPCS: Performed by: PHYSICIAN ASSISTANT

## 2021-07-04 PROCEDURE — 99282 EMERGENCY DEPT VISIT SF MDM: CPT

## 2021-07-04 PROCEDURE — 96372 THER/PROPH/DIAG INJ SC/IM: CPT

## 2021-07-04 RX ORDER — MORPHINE SULFATE 4 MG/ML
4 INJECTION, SOLUTION INTRAMUSCULAR; INTRAVENOUS ONCE
Status: DISCONTINUED | OUTPATIENT
Start: 2021-07-04 | End: 2021-07-04

## 2021-07-04 RX ORDER — CARVEDILOL 6.25 MG/1
TABLET ORAL
COMMUNITY
Start: 2021-06-08

## 2021-07-04 RX ORDER — KETOROLAC TROMETHAMINE 10 MG/1
10 TABLET, FILM COATED ORAL EVERY 6 HOURS PRN
Qty: 20 TABLET | Refills: 0 | Status: ON HOLD | OUTPATIENT
Start: 2021-07-04 | End: 2021-07-29

## 2021-07-04 RX ORDER — METHYLPREDNISOLONE SODIUM SUCCINATE 125 MG/2ML
80 INJECTION, POWDER, LYOPHILIZED, FOR SOLUTION INTRAMUSCULAR; INTRAVENOUS ONCE
Status: COMPLETED | OUTPATIENT
Start: 2021-07-04 | End: 2021-07-04

## 2021-07-04 RX ORDER — KETOROLAC TROMETHAMINE 30 MG/ML
30 INJECTION, SOLUTION INTRAMUSCULAR; INTRAVENOUS ONCE
Status: COMPLETED | OUTPATIENT
Start: 2021-07-04 | End: 2021-07-04

## 2021-07-04 RX ORDER — TRAMADOL HYDROCHLORIDE 50 MG/1
50 TABLET ORAL EVERY 6 HOURS PRN
Qty: 12 TABLET | Refills: 0 | Status: SHIPPED | OUTPATIENT
Start: 2021-07-04 | End: 2021-07-07

## 2021-07-04 RX ORDER — METHYLPREDNISOLONE 4 MG/1
TABLET ORAL
Qty: 1 KIT | Refills: 0 | Status: SHIPPED | OUTPATIENT
Start: 2021-07-04 | End: 2021-07-10

## 2021-07-04 RX ADMIN — METHYLPREDNISOLONE SODIUM SUCCINATE 80 MG: 125 INJECTION, POWDER, FOR SOLUTION INTRAMUSCULAR; INTRAVENOUS at 13:03

## 2021-07-04 RX ADMIN — KETOROLAC TROMETHAMINE 30 MG: 30 INJECTION, SOLUTION INTRAMUSCULAR; INTRAVENOUS at 13:03

## 2021-07-04 ASSESSMENT — PAIN SCALES - GENERAL
PAINLEVEL_OUTOF10: 8
PAINLEVEL_OUTOF10: 8

## 2021-07-04 NOTE — ED PROVIDER NOTES
[facility] Emergency Department    CHIEF COMPLAINT  Back Pain (pt to er with chronic back pain. . pt sees pain managment and isnt currently prescribed pain meds. . states is waiting for approval for MRI)      SHARED SERVICE VISIT  Evaluated by LYNDA. My supervising physician was available for consultation. HISTORY OF PRESENT ILLNESS  Melody Tejeda is a 63 y.o. female who presents to the ED complaining of 2-week history of worsening low back pain. Patient with history of back pain and issues and reports that this does feel consistent with history of sciatica although is experiencing slightly more pressure in hip. Pain worse with touch and movement. Does not appear to radiate. Currently waiting on MRI to have epidurals placed by pain management. Reports that pain does radiate into the leg described as burning. History of neuropathy which appears fairly stable. She denies any abdominal pain. No trauma or injury. No loss of bowel or bladder function. No saddle anesthesia. Ambulatory although does increase pain. Denies fevers chills. No other complaints, modifying factors or associated symptoms. Nursing notes reviewed.    Past Medical History:   Diagnosis Date    Back problem     CMV (cytomegalovirus infection) status positive (HCC)     Colitis     Degeneration of lumbar or lumbosacral intervertebral disc 9/23/2016    Depression     Fibromyalgia     GERD (gastroesophageal reflux disease)     Horseshoe kidney     Hyperlipidemia     Neuropathy     Sciatica      Past Surgical History:   Procedure Laterality Date    APPENDECTOMY      CARPAL TUNNEL RELEASE      CERVICAL FUSION N/A 10/27/2020    C6-7 ANTERIOR CERVICAL DISCECTOMY AND FUSION performed by Anaid Glaser MD at [address] [place] [address]  2011    COLONOSCOPY  8/29/2013    CARMEL BX    ENDOSCOPY, COLON, DIAGNOSTIC  2011    EPIDURAL STEROID INJECTION Right 6/6/2019    RIGHT CERVICAL THREE FOUR, CERVICAL FOUR FIVE, CERVICAL FIVE SIX, CERVICAL SIX SEVEN FACET INJECTION SITE CONFIRMED BY FLUOROSCOPY performed by Vernon Albrecht MD at [facility] Right 2019    RIGHT CERVICAL FOUR, CERVICAL FIVE, CERVICAL SIX, CERVICAL SEVEN MEDIAL BRANCH BLOCK SITE CONFIRMED BY FLUOROSCOPY performed by Vernon Albrecht MD at [facility] Right 2019    RIGHT CERVICAL FOUR, CERVICAL FIVE, CERVICAL SIX, CERVICAL SEVEN RADIOFREQUENCY ABLATION SITE CONFIRMED BY FLUOROSCOPY performed by Vernon Albrecht MD at [facility]      complete    PAIN MANAGEMENT PROCEDURE N/A 2020    KNEE MEDIAL AND LATERAL INTRA-ARTICULAR INJECTION SITE CONFIRMED BY FLUOROSCOPY performed by Vernon Albrecht MD at [place] [address] ENDOSCOPY  2013    gastritis bx done     Family History   Problem Relation Age of Onset    Cancer Father     Heart Attack Father 72    Breast Cancer Mother     Other Mother         fibromyalgia    Cancer Sister         melanoma    Breast Cancer Maternal Grandmother      Social History     Socioeconomic History    Marital status:       Spouse name: Not on file    Number of children: Not on file    Years of education: Not on file    Highest education level: Not on file   Occupational History    Not on file   Tobacco Use    Smoking status: Current Some Day Smoker     Packs/day: 0.50     Years: 30.00     Pack years: 15.00     Types: Cigarettes     Last attempt to quit: 2020     Years since quittin.9    Smokeless tobacco: Never Used   Vaping Use    Vaping Use: Never used   Substance and Sexual Activity    Alcohol use: No    Drug use: Yes     Types: Marijuana    Sexual activity: Not Currently   Other Topics Concern    Not on file   Social History Narrative    Not on file     Social Determinants of Health     Financial Resource Strain:     Difficulty of Paying Living Expenses:    Food Insecurity:     Worried About Running Out of Food in the Last Year:     [address] in the Last Year:    Transportation Needs:     Lack of Transportation (Medical):  Lack of Transportation (Non-Medical):    Physical Activity:     Days of Exercise per Week:     Minutes of Exercise per Session:    Stress:     Feeling of Stress :    Social Connections:     Frequency of Communication with Friends and Family:     Frequency of Social Gatherings with Friends and Family:     Attends Oriental orthodox Services:     Active Member of Clubs or Organizations:     Attends Club or Organization Meetings:     Marital Status:    Intimate Partner Violence:     Fear of Current or Ex-Partner:     Emotionally Abused:     Physically Abused:     Sexually Abused:      Current Facility-Administered Medications   Medication Dose Route Frequency Provider Last Rate Last Admin    ketorolac (TORADOL) injection 30 mg  30 mg Intramuscular Once [place], [place]        methylPREDNISolone sodium (SOLU-MEDROL) injection 80 mg  80 mg Intramuscular Once [place], [place]         Current Outpatient Medications   Medication Sig Dispense Refill    ketorolac (TORADOL) 10 MG tablet Take 1 tablet by mouth every 6 hours as needed for Pain 20 tablet 0    traMADol (ULTRAM) 50 MG tablet Take 1 tablet by mouth every 6 hours as needed for Pain for up to 3 days. 12 tablet 0    methylPREDNISolone (MEDROL, FLORIAN,) 4 MG tablet Take by mouth.  1 kit 0    tiZANidine (ZANAFLEX) 4 MG capsule Take 1 capsule by mouth 3 times daily as needed for Muscle spasms 60 capsule 0    carvedilol (COREG) 6.25 MG tablet        Allergies   Allergen Reactions    Robaxin [Methocarbamol] Nausea Only     Severe nausea    Codeine Rash    Hydrocodone-Acetaminophen Itching and Nausea And Vomiting       REVIEW OF SYSTEMS  10 systems reviewed, pertinent positives per HPI otherwise noted to be negative    PHYSICAL EXAM  BP (!) 147/100   Pulse 92   Temp 98.3 °F (36.8 °C) (Oral)   Resp 16   Ht 5' 4\" (1.626 m)   Wt 195 lb (88.5 kg)   SpO2 96%   BMI 33.47 kg/m²   GENERAL APPEARANCE: Awake and alert. Cooperative. No acute distress. HEAD: Normocephalic. Atraumatic. EYES: PERRL. EOM's grossly intact. ENT: Mucous membranes are moist.   NECK: Supple. HEART: RRR. No murmurs. LUNGS: Respirations unlabored. CTAB. Good air exchange. Speaking comfortably in full sentences. ABDOMEN: Soft. Non-distended. Non-tender. No guarding or rebound. No midline pulsatile mass. BACK: On exam of thoracic and lumbar spine, there is no swelling, bruising, or color change noted. There is no midline bony tenderness, without crepitus, deformity, or step off. Patient exhibits tenderness of paraspinal musculature to right of midline. There is moderate point tenderness over the right SI Joint. EXTREMITIES: No peripheral edema. Biceps and patellar DTRs +2 bilaterally. Moves all extremities equally. All extremities neurovascularly intact. SKIN: Warm and dry. No acute rashes. NEUROLOGICAL: Alert and oriented. CN's 2-12 intact. No gross facial drooping. Strength 5/5, sensation intact. HSNE spine intact. PSYCHIATRIC: Normal mood and affect. ED COURSE  Patient received Toradol and Solu-Medrol IM for pain, with good relief. Triage vitals stable. As there are no emergent signs or symptoms at this time do not feel that imaging is indicated. Will discharge patient with short course of analgesics with steroids and anti-inflammatories with recommendations for continued follow-up with Ortho for MRI and epidural injections. I discussed return precautions and recommendations for follow-up otherwise and patient is in agreement and comfortable discharge. A discussion was had with Ms. Zeina regarding low back pain, ED findings and recommendations for follow-up.   Risk management discussed and shared decision making had with patient and/or surrogate. All questions were answered. Patient will follow up with PCP and Ortho within 1 to 2 days for further evaluation/treatment. All questions answered. Patient will return to ED for new/worsening symptoms. Patient was sent home with a prescription for Ultram, Toradol, and Medrol Dosepak. MDM  I estimate there is LOW risk for ABDOMINAL AORTIC ANEURYSM, CAUDA EQUINA SYNDROME, EPIDURAL MASS LESION, SPINAL STENOSIS, OR HERNIATED DISK CAUSING SEVERE STENOSIS, thus I consider the discharge disposition reasonable. Rukhsana Schneider and I have discussed the diagnosis and risks, and we agree with discharging home to follow-up with their primary doctor. We also discussed returning to the Emergency Department immediately if new or worsening symptoms occur. We have discussed the symptoms which are most concerning (e.g., saddle anesthesia, urinary or bowel incontinence or retention, changing or worsening pain) that necessitate immediate return. Final Impression  1. Acute exacerbation of chronic low back pain      Blood pressure (!) 147/100, pulse 92, temperature 98.3 °F (36.8 °C), temperature source Oral, resp. rate 16, height 5' 4\" (1.626 m), weight 195 lb (88.5 kg), SpO2 96 %, not currently breastfeeding. DISPOSITION  Patient was discharged to home in good condition.           Michael Awan, [address]  07/04/21 2950

## 2021-07-15 ENCOUNTER — HOSPITAL ENCOUNTER (OUTPATIENT)
Dept: MRI IMAGING | Age: 56
Discharge: HOME | End: 2021-07-15
Payer: COMMERCIAL

## 2021-07-15 DIAGNOSIS — M54.16 LUMBAR RADICULOPATHY: ICD-10-CM

## 2021-07-15 PROCEDURE — 72148 MRI LUMBAR SPINE W/O DYE: CPT

## 2021-07-25 ENCOUNTER — HOSPITAL ENCOUNTER (EMERGENCY)
Age: 56
Discharge: HOME | End: 2021-07-25
Payer: COMMERCIAL

## 2021-07-25 ENCOUNTER — APPOINTMENT (OUTPATIENT)
Dept: CT IMAGING | Age: 56
End: 2021-07-25
Payer: COMMERCIAL

## 2021-07-25 VITALS
TEMPERATURE: 98.8 F | BODY MASS INDEX: 33.12 KG/M2 | SYSTOLIC BLOOD PRESSURE: 108 MMHG | WEIGHT: 194 LBS | DIASTOLIC BLOOD PRESSURE: 78 MMHG | OXYGEN SATURATION: 99 % | RESPIRATION RATE: 15 BRPM | HEART RATE: 85 BPM | HEIGHT: 64 IN

## 2021-07-25 DIAGNOSIS — R10.9 RIGHT SIDED ABDOMINAL PAIN: Primary | ICD-10-CM

## 2021-07-25 LAB
A/G RATIO: 2.1 (ref 1.1–2.2)
ALBUMIN SERPL-MCNC: 4.4 G/DL (ref 3.4–5)
ALP BLD-CCNC: 93 U/L (ref 40–129)
ALT SERPL-CCNC: 16 U/L (ref 10–40)
ANION GAP SERPL CALCULATED.3IONS-SCNC: 7 MMOL/L (ref 3–16)
AST SERPL-CCNC: 17 U/L (ref 15–37)
BASOPHILS ABSOLUTE: 0.1 K/UL (ref 0–0.2)
BASOPHILS RELATIVE PERCENT: 1.1 %
BILIRUB SERPL-MCNC: <0.2 MG/DL (ref 0–1)
BILIRUBIN URINE: NEGATIVE
BLOOD, URINE: NEGATIVE
BUN BLDV-MCNC: 15 MG/DL (ref 7–20)
CALCIUM SERPL-MCNC: 9.5 MG/DL (ref 8.3–10.6)
CHLORIDE BLD-SCNC: 105 MMOL/L (ref 99–110)
CLARITY: CLEAR
CO2: 26 MMOL/L (ref 21–32)
COLOR: YELLOW
CREAT SERPL-MCNC: 1 MG/DL (ref 0.6–1.1)
EOSINOPHILS ABSOLUTE: 0.1 K/UL (ref 0–0.6)
EOSINOPHILS RELATIVE PERCENT: 1.8 %
GFR AFRICAN AMERICAN: >60
GFR NON-AFRICAN AMERICAN: 57
GLOBULIN: 2.1 G/DL
GLUCOSE BLD-MCNC: 105 MG/DL (ref 70–99)
GLUCOSE URINE: NEGATIVE MG/DL
HCT VFR BLD CALC: 40.4 % (ref 36–48)
HEMOGLOBIN: 13.8 G/DL (ref 12–16)
KETONES, URINE: NEGATIVE MG/DL
LEUKOCYTE ESTERASE, URINE: NEGATIVE
LYMPHOCYTES ABSOLUTE: 3.5 K/UL (ref 1–5.1)
LYMPHOCYTES RELATIVE PERCENT: 43.4 %
MCH RBC QN AUTO: 30.1 PG (ref 26–34)
MCHC RBC AUTO-ENTMCNC: 34.1 G/DL (ref 31–36)
MCV RBC AUTO: 88.4 FL (ref 80–100)
MICROSCOPIC EXAMINATION: NORMAL
MONOCYTES ABSOLUTE: 0.5 K/UL (ref 0–1.3)
MONOCYTES RELATIVE PERCENT: 6 %
NEUTROPHILS ABSOLUTE: 3.8 K/UL (ref 1.7–7.7)
NEUTROPHILS RELATIVE PERCENT: 47.7 %
NITRITE, URINE: NEGATIVE
PDW BLD-RTO: 14.2 % (ref 12.4–15.4)
PH UA: 8 (ref 5–8)
PLATELET # BLD: 207 K/UL (ref 135–450)
PLATELET SLIDE REVIEW: ADEQUATE
PMV BLD AUTO: 8.7 FL (ref 5–10.5)
POTASSIUM REFLEX MAGNESIUM: 3.9 MMOL/L (ref 3.5–5.1)
PROTEIN UA: NEGATIVE MG/DL
RBC # BLD: 4.57 M/UL (ref 4–5.2)
SLIDE REVIEW: NORMAL
SODIUM BLD-SCNC: 138 MMOL/L (ref 136–145)
SPECIFIC GRAVITY UA: 1.01 (ref 1–1.03)
TOTAL PROTEIN: 6.5 G/DL (ref 6.4–8.2)
URINE REFLEX TO CULTURE: NORMAL
URINE TYPE: NORMAL
UROBILINOGEN, URINE: 0.2 E.U./DL
WBC # BLD: 8.1 K/UL (ref 4–11)

## 2021-07-25 PROCEDURE — 80053 COMPREHEN METABOLIC PANEL: CPT

## 2021-07-25 PROCEDURE — 6360000002 HC RX W HCPCS: Performed by: PHYSICIAN ASSISTANT

## 2021-07-25 PROCEDURE — 85025 COMPLETE CBC W/AUTO DIFF WBC: CPT

## 2021-07-25 PROCEDURE — 96374 THER/PROPH/DIAG INJ IV PUSH: CPT

## 2021-07-25 PROCEDURE — 81003 URINALYSIS AUTO W/O SCOPE: CPT

## 2021-07-25 PROCEDURE — 74177 CT ABD & PELVIS W/CONTRAST: CPT

## 2021-07-25 PROCEDURE — 6360000004 HC RX CONTRAST MEDICATION: Performed by: PHYSICIAN ASSISTANT

## 2021-07-25 PROCEDURE — 99284 EMERGENCY DEPT VISIT MOD MDM: CPT

## 2021-07-25 RX ORDER — KETOROLAC TROMETHAMINE 30 MG/ML
15 INJECTION, SOLUTION INTRAMUSCULAR; INTRAVENOUS ONCE
Status: COMPLETED | OUTPATIENT
Start: 2021-07-25 | End: 2021-07-25

## 2021-07-25 RX ADMIN — IOPAMIDOL 75 ML: 755 INJECTION, SOLUTION INTRAVENOUS at 19:12

## 2021-07-25 RX ADMIN — KETOROLAC TROMETHAMINE 15 MG: 30 INJECTION, SOLUTION INTRAMUSCULAR; INTRAVENOUS at 18:13

## 2021-07-25 ASSESSMENT — PAIN DESCRIPTION - LOCATION: LOCATION: GROIN

## 2021-07-25 ASSESSMENT — PAIN SCALES - GENERAL
PAINLEVEL_OUTOF10: 10
PAINLEVEL_OUTOF10: 10

## 2021-07-25 ASSESSMENT — ENCOUNTER SYMPTOMS
RESPIRATORY NEGATIVE: 1
ABDOMINAL PAIN: 1

## 2021-07-25 ASSESSMENT — PAIN DESCRIPTION - ORIENTATION: ORIENTATION: RIGHT

## 2021-07-25 NOTE — ED PROVIDER NOTES
[address]  EMERGENCY DEPARTMENT ENCOUNTER        Pt Name: Young Vallejo  MRN: [id number]  [place] 1965  Date of evaluation: 7/25/2021  Provider: Janett Sorenson PA-C  PCP: Alejandra Aguilar MD  Note Started: 5:51 PM EDT       LYNDA. I have evaluated this patient. My supervising physician was available for consultation. CHIEF COMPLAINT       Chief Complaint   Patient presents with    Groin Pain     right groin pain/bruise since yesterday denies injury       HISTORY OF PRESENT ILLNESS   (Location, Timing/Onset, Context/Setting, Quality, Duration, Modifying Factors, Severity, Associated Signs and Symptoms)  Note limiting factors. Chief Complaint: Right-sided groin pain    Young Vallejo is a 63 y.o. female with a past medical history of fibromyalgia, degenerated disc disease, arthritis, GERD, depression, hyperlipidemia brought in today by private vehicle with complaints of right-sided lower abdominal pain/groin pain and hematoma to right groin. Denies any injury or trauma. Denies any heavy lifting twisting or bending. Onset of symptoms started yesterday. Duration of symptoms have been persistent since onset. Context includes right-sided lower abdominal pain/groin pain. She denies any fevers or chills. Denies nausea vomiting or diarrhea. Reports generalized lower abdominal tenderness. Rates the pain a 10 out of 10. No radiation of pain. She has not taken anything for pain control at home. Denies urinary symptoms. Denies vaginal bleeding. Otherwise denies any other complaints. Nothing seems to make symptoms better or worse. Nursing Notes were all reviewed and agreed with or any disagreements were addressed in the HPI. REVIEW OF SYSTEMS    (2-9 systems for level 4, 10 or more for level 5)     Review of Systems   Constitutional: Negative. Respiratory: Negative. Cardiovascular: Negative. Gastrointestinal: Positive for abdominal pain.    Genitourinary: Negative. Musculoskeletal: Negative. Skin: Negative. Neurological: Negative. Positives and Pertinent negatives as per HPI. Except as noted above in the ROS, all other systems were reviewed and negative.        PAST MEDICAL HISTORY     Past Medical History:   Diagnosis Date    Back problem     CMV (cytomegalovirus infection) status positive (HCC)     Colitis     Degeneration of lumbar or lumbosacral intervertebral disc 9/23/2016    Depression     Fibromyalgia     GERD (gastroesophageal reflux disease)     Horseshoe kidney     Hyperlipidemia     Neuropathy     Sciatica          SURGICAL HISTORY     Past Surgical History:   Procedure Laterality Date    APPENDECTOMY      CARPAL TUNNEL RELEASE      CERVICAL FUSION N/A 10/27/2020    C6-7 ANTERIOR CERVICAL DISCECTOMY AND FUSION performed by Dereck Baker MD at [address] [place] [address]  2011    COLONOSCOPY  8/29/2013    CARMEL BX    ENDOSCOPY, COLON, DIAGNOSTIC  2011    EPIDURAL STEROID INJECTION Right 6/6/2019    RIGHT CERVICAL THREE FOUR, CERVICAL FOUR FIVE, CERVICAL FIVE SIX, CERVICAL SIX SEVEN FACET INJECTION SITE CONFIRMED BY FLUOROSCOPY performed by Vesna San MD at [facility] Right 7/18/2019    RIGHT CERVICAL FOUR, CERVICAL FIVE, CERVICAL SIX, CERVICAL SEVEN MEDIAL BRANCH BLOCK SITE CONFIRMED BY FLUOROSCOPY performed by Vesna San MD at [facility] Right 8/29/2019    RIGHT CERVICAL FOUR, CERVICAL FIVE, CERVICAL SIX, CERVICAL SEVEN RADIOFREQUENCY ABLATION SITE CONFIRMED BY FLUOROSCOPY performed by Vesna San MD at [address] [place] [address]      complete    PAIN MANAGEMENT PROCEDURE N/A 2/20/2020    KNEE MEDIAL AND LATERAL INTRA-ARTICULAR INJECTION SITE CONFIRMED BY FLUOROSCOPY performed by Vesna San MD at [place] [address] ENDOSCOPY  8/29/2013    gastritis bx done         [address]       Discharge Medication List as of 2021  8:42 PM      CONTINUE these medications which have NOT CHANGED    Details   carvedilol (COREG) 6.25 MG tablet Historical Med      ketorolac (TORADOL) 10 MG tablet Take 1 tablet by mouth every 6 hours as needed for Pain, Disp-20 tablet, R-0Normal      tiZANidine (ZANAFLEX) 4 MG capsule Take 1 capsule by mouth 3 times daily as needed for Muscle spasms, Disp-60 capsule,R-0Print               ALLERGIES     Robaxin [methocarbamol] and Codeine    FAMILYHISTORY       Family History   Problem Relation Age of Onset    Cancer Father     Heart Attack Father 72    Breast Cancer Mother     Other Mother         fibromyalgia    Cancer Sister         melanoma    Breast Cancer Maternal Grandmother           SOCIAL HISTORY       Social History     Tobacco Use    Smoking status: Current Some Day Smoker     Packs/day: 0.50     Years: 30.00     Pack years: 15.00     Types: Cigarettes     Last attempt to quit: 2020     Years since quittin.9    Smokeless tobacco: Never Used   Vaping Use    Vaping Use: Never used   Substance Use Topics    Alcohol use: No    Drug use: Yes     Types: Marijuana       SCREENINGS    Fithian Coma Scale  Eye Opening: Spontaneous  Best Verbal Response: Oriented  Best Motor Response: Obeys commands  Aryan Coma Scale Score: 15        PHYSICAL EXAM    (up to 7 for level 4, 8 or more for level 5)     ED Triage Vitals [21 1702]   BP Temp Temp Source Pulse Resp SpO2 Height Weight   (!) 157/91 97.2 °F (36.2 °C) Oral 79 20 97 % 5' 4\" (1.626 m) 194 lb (88 kg)       Physical Exam  Vitals and nursing note reviewed. Constitutional:       General: She is awake. She is not in acute distress. Appearance: Normal appearance. She is well-developed. She is not ill-appearing, toxic-appearing or diaphoretic. HENT:      Head: Normocephalic and atraumatic.       Nose: Nose normal.   Eyes:      General:         Right eye: No discharge. Left eye: No discharge. Cardiovascular:      Rate and Rhythm: Normal rate and regular rhythm. Pulses:           Radial pulses are 2+ on the right side and 2+ on the left side. Heart sounds: Normal heart sounds. No murmur heard. No gallop. Pulmonary:      Effort: Pulmonary effort is normal. No respiratory distress. Breath sounds: Normal breath sounds. No decreased breath sounds, wheezing, rhonchi or rales. Chest:      Chest wall: No tenderness. Abdominal:      General: Abdomen is flat. Bowel sounds are normal.      Palpations: Abdomen is soft. Tenderness: There is abdominal tenderness in the right lower quadrant. There is no right CVA tenderness, left CVA tenderness, guarding or rebound. Negative signs include Berrios's sign and McBurney's sign. Musculoskeletal:         General: No deformity. Normal range of motion. Cervical back: Normal range of motion and neck supple. Right lower leg: No edema. Left lower leg: No edema. Skin:     General: Skin is warm and dry. Neurological:      Mental Status: She is alert and oriented to person, place, and time. Psychiatric:         Behavior: Behavior normal. Behavior is cooperative.          DIAGNOSTIC RESULTS   LABS:    Labs Reviewed   COMPREHENSIVE METABOLIC PANEL W/ REFLEX TO MG FOR LOW K - Abnormal; Notable for the following components:       Result Value    Glucose 105 (*)     GFR Non- 57 (*)     All other components within normal limits    Narrative:     Performed at:  [place] [address],  [place], [facility]   Phone [phone]   CBC WITH AUTO DIFFERENTIAL    Narrative:     Performed at:  [place] [address],  [place], [facility]   Phone [phone]   URINE RT REFLEX TO CULTURE    Narrative:     Performed at:  [facility] - [place] [address],  [place], [facility] [address]   Phone [phone]       When ordered only abnormal lab results are displayed. All other labs were within normal range or not returned as of this dictation. EKG: When ordered, EKG's are interpreted by the Emergency Department Physician in the absence of a cardiologist.  Please see their note for interpretation of EKG. RADIOLOGY:   Non-plain film images such as CT, Ultrasound and MRI are read by the radiologist. Plain radiographic images are visualized and preliminarily interpreted by the ED Provider with the below findings:        Interpretation per the Radiologist below, if available at the time of this note:    CT ABDOMEN PELVIS W IV CONTRAST Additional Contrast? None   Final Result   No acute intra-abdominal or intrapelvic abnormalities are noted. 5 mm nonobstructing right renal stone. .  Horseshoe kidney           No results found. PROCEDURES   Unless otherwise noted below, none     Procedures    CRITICAL CARE TIME   N/A    CONSULTS:  None      EMERGENCY DEPARTMENT COURSE and DIFFERENTIAL DIAGNOSIS/MDM:   Vitals:    Vitals:    07/25/21 1702 07/25/21 1814 07/25/21 1903 07/25/21 2047   BP: (!) 157/91 (!) 146/84 90/74 108/78   Pulse: 79 85 86 85   Resp: 20 18 16 15   Temp: 97.2 °F (36.2 °C)   98.8 °F (37.1 °C)   TempSrc: Oral   Oral   SpO2: 97% 98% 98% 99%   Weight: 194 lb (88 kg)      Height: 5' 4\" (1.626 m)          Patient was given the following medications:  Medications   ketorolac (TORADOL) injection 15 mg (15 mg Intravenous Given 7/25/21 1813)   iopamidol (ISOVUE-370) 76 % injection 75 mL (75 mLs Intravenous Given 7/25/21 1912)           Patient brought in today by private vehicle with complaints of right lower abdominal pain/groin pain. On exam she is alert oriented afebrile breathing on room air satting at 97%. Nontoxic. No acute respiratory distress. Old labs and records reviewed. Patient seen and evaluated by myself and my attending was available for consultation.     CBC Medication List as of 7/25/2021  8:42 PM          DISCONTINUED MEDICATIONS:  Discharge Medication List as of 7/25/2021  8:42 PM                 (Please note that portions of this note were completed with a voice recognition program.  Efforts were made to edit the dictations but occasionally words are mis-transcribed.)    Malini Montalvo PA-C (electronically signed)            Malini Montalvo PA-C  07/25/21 2104       Malini Montalvo PA-C  07/25/21 2105

## 2021-07-29 ENCOUNTER — HOSPITAL ENCOUNTER (OUTPATIENT)
Age: 56
Setting detail: OUTPATIENT SURGERY
Discharge: HOME | End: 2021-07-29
Attending: PAIN MEDICINE | Admitting: PAIN MEDICINE
Payer: COMMERCIAL

## 2021-07-29 VITALS
TEMPERATURE: 97 F | OXYGEN SATURATION: 97 % | SYSTOLIC BLOOD PRESSURE: 151 MMHG | HEART RATE: 74 BPM | HEIGHT: 64 IN | BODY MASS INDEX: 33.46 KG/M2 | WEIGHT: 196 LBS | RESPIRATION RATE: 16 BRPM | DIASTOLIC BLOOD PRESSURE: 85 MMHG

## 2021-07-29 PROCEDURE — 6360000002 HC RX W HCPCS: Performed by: PAIN MEDICINE

## 2021-07-29 PROCEDURE — 6360000004 HC RX CONTRAST MEDICATION: Performed by: PAIN MEDICINE

## 2021-07-29 PROCEDURE — 7100000010 HC PHASE II RECOVERY - FIRST 15 MIN: Performed by: PAIN MEDICINE

## 2021-07-29 PROCEDURE — 64483 NJX AA&/STRD TFRM EPI L/S 1: CPT | Performed by: PAIN MEDICINE

## 2021-07-29 PROCEDURE — 3600000012 HC SURGERY LEVEL 2 ADDTL 15MIN: Performed by: PAIN MEDICINE

## 2021-07-29 PROCEDURE — 2709999900 HC NON-CHARGEABLE SUPPLY: Performed by: PAIN MEDICINE

## 2021-07-29 PROCEDURE — 64484 NJX AA&/STRD TFRM EPI L/S EA: CPT | Performed by: PAIN MEDICINE

## 2021-07-29 PROCEDURE — 2500000003 HC RX 250 WO HCPCS: Performed by: PAIN MEDICINE

## 2021-07-29 PROCEDURE — 3600000002 HC SURGERY LEVEL 2 BASE: Performed by: PAIN MEDICINE

## 2021-07-29 RX ORDER — BETAMETHASONE SODIUM PHOSPHATE AND BETAMETHASONE ACETATE 3; 3 MG/ML; MG/ML
INJECTION, SUSPENSION INTRA-ARTICULAR; INTRALESIONAL; INTRAMUSCULAR; SOFT TISSUE
Status: DISCONTINUED
Start: 2021-07-29 | End: 2021-07-29 | Stop reason: WASHOUT

## 2021-07-29 RX ORDER — LIDOCAINE HYDROCHLORIDE 10 MG/ML
INJECTION, SOLUTION EPIDURAL; INFILTRATION; INTRACAUDAL; PERINEURAL PRN
Status: DISCONTINUED | OUTPATIENT
Start: 2021-07-29 | End: 2021-07-29 | Stop reason: ALTCHOICE

## 2021-07-29 ASSESSMENT — PAIN DESCRIPTION - DESCRIPTORS: DESCRIPTORS: SHARP;PRESSURE

## 2021-07-29 NOTE — PROCEDURES
Less than 1 cc      Pulse Ox Monitoring was done.      Exact similar procedure was done at _R L5__ level.            ________________________________                   Lynder Soulier, M.D.

## 2021-07-29 NOTE — PROGRESS NOTES
Patient was able to demonstrate the ability to move from a reclining position to an upright position within the recliner. none

## 2021-10-30 ENCOUNTER — HOSPITAL ENCOUNTER (OUTPATIENT)
Age: 56
Discharge: HOME | End: 2021-10-30
Payer: COMMERCIAL

## 2021-10-30 ENCOUNTER — HOSPITAL ENCOUNTER (OUTPATIENT)
Dept: GENERAL RADIOLOGY | Age: 56
Discharge: HOME | End: 2021-10-30
Payer: COMMERCIAL

## 2021-10-30 DIAGNOSIS — R52 PAIN: ICD-10-CM

## 2021-10-30 PROCEDURE — 73502 X-RAY EXAM HIP UNI 2-3 VIEWS: CPT

## 2021-10-30 PROCEDURE — 72202 X-RAY EXAM SI JOINTS 3/> VWS: CPT

## 2021-10-30 PROCEDURE — 73552 X-RAY EXAM OF FEMUR 2/>: CPT

## 2021-12-28 ENCOUNTER — APPOINTMENT (OUTPATIENT)
Dept: CT IMAGING | Age: 56
End: 2021-12-28
Payer: COMMERCIAL

## 2021-12-28 ENCOUNTER — APPOINTMENT (OUTPATIENT)
Dept: GENERAL RADIOLOGY | Age: 56
End: 2021-12-28
Payer: COMMERCIAL

## 2021-12-28 ENCOUNTER — HOSPITAL ENCOUNTER (EMERGENCY)
Age: 56
Discharge: HOME | End: 2021-12-28
Payer: COMMERCIAL

## 2021-12-28 VITALS
WEIGHT: 200 LBS | BODY MASS INDEX: 34.15 KG/M2 | SYSTOLIC BLOOD PRESSURE: 122 MMHG | OXYGEN SATURATION: 96 % | RESPIRATION RATE: 18 BRPM | DIASTOLIC BLOOD PRESSURE: 74 MMHG | HEART RATE: 89 BPM | TEMPERATURE: 97 F | HEIGHT: 64 IN

## 2021-12-28 DIAGNOSIS — M25.552 LEFT HIP PAIN: Primary | ICD-10-CM

## 2021-12-28 DIAGNOSIS — S76.012A STRAIN OF LEFT HIP, INITIAL ENCOUNTER: ICD-10-CM

## 2021-12-28 PROCEDURE — 6370000000 HC RX 637 (ALT 250 FOR IP): Performed by: PHYSICIAN ASSISTANT

## 2021-12-28 PROCEDURE — 73700 CT LOWER EXTREMITY W/O DYE: CPT

## 2021-12-28 PROCEDURE — 99283 EMERGENCY DEPT VISIT LOW MDM: CPT

## 2021-12-28 PROCEDURE — 73502 X-RAY EXAM HIP UNI 2-3 VIEWS: CPT

## 2021-12-28 RX ORDER — OXYCODONE HYDROCHLORIDE AND ACETAMINOPHEN 5; 325 MG/1; MG/1
2 TABLET ORAL ONCE
Status: COMPLETED | OUTPATIENT
Start: 2021-12-28 | End: 2021-12-28

## 2021-12-28 RX ADMIN — OXYCODONE HYDROCHLORIDE AND ACETAMINOPHEN 2 TABLET: 5; 325 TABLET ORAL at 15:03

## 2021-12-28 ASSESSMENT — PAIN SCALES - GENERAL
PAINLEVEL_OUTOF10: 9
PAINLEVEL_OUTOF10: 10
PAINLEVEL_OUTOF10: 7

## 2021-12-28 NOTE — ED NOTES
Pt fitted to walker, demonstrated proper usage, ambulated from ER without assistance using walker     Janice Garrison RN  12/28/21 4692

## 2021-12-29 NOTE — ED PROVIDER NOTES
[address] ED  EMERGENCY DEPARTMENT ENCOUNTER        Pt Name: Dorothy Sherwood  MRN: [id number]  [place] 1965  Date of evaluation: 12/28/2021  Provider: Macy Holstein, PA  PCP: Brittany Washington MD    This patient was not seen and evaluated by the attending physician No att. providers found. I have evaluated this patient. My supervising physician was available for consultation. CHIEF COMPLAINT       Chief Complaint   Patient presents with    Hip Pain     Patient reports left hip pain after using her granddaughter's hover board       HISTORY OF PRESENT ILLNESS   (Location/Symptom, Timing/Onset, Context/Setting, Quality, Duration, Modifying Factors, Severity)  Note limiting factors. Dorothy Sherwood is a 61 y.o. female who presents via private vehicle from her home for hip pain. ED Course as of 12/29/21 1022   Tue Dec 28, 2021   1337 She notes she can walk but it is extremely painful. She injured her left hip on a hoverboard, she twiseted quickly on Sunday trying to do this. She notes chronic back back but this is not similar to her chronic back pain or sciatica. She notes the pain is sharp and tight muscle spasm that is from the outside of the hip and radiates to the inner hip. She has been using a heating pad at home, doing hot baths, motrin, tizanidine. She is fine as long as she is sitting but has a hard time standing. She notes weakness in the hip. [CS]      ED Course User Index  [CS] Macy Holstein, [place]     Nursing Notes were all reviewed and agreed with or any disagreements were addressed  in the HPI. Pt was seen during the [place] 19 pandemic. Appropriate PPE worn by ME during patient encounters. Pt seen during a time with constrained hospital bed capacity and other potential inpatient and outpatient resources were constrained due to the viral pandemic.      REVIEW OF SYSTEMS    (2-9 systems for level 4, 10 or more for level 5)     Review of Systems    Positives and Pertinent negatives as per HPI. Except as noted abovein the ROS, all other systems were reviewed and negative.        PAST MEDICAL HISTORY     Past Medical History:   Diagnosis Date    Back problem     CMV (cytomegalovirus infection) status positive (HCC)     Colitis     Degeneration of lumbar or lumbosacral intervertebral disc 09/23/2016    Depression     Fibromyalgia     GERD (gastroesophageal reflux disease)     Horseshoe kidney     HTN (hypertension)     Hyperlipidemia     Neuropathy     Sciatica          SURGICAL HISTORY     Past Surgical History:   Procedure Laterality Date    APPENDECTOMY      CARPAL TUNNEL RELEASE      CERVICAL FUSION N/A 10/27/2020    C6-7 ANTERIOR CERVICAL DISCECTOMY AND FUSION performed by Sergio Rahman MD at [facility]  2011    COLONOSCOPY  8/29/2013    CARMEL BX    ENDOSCOPY, COLON, DIAGNOSTIC  2011    EPIDURAL STEROID INJECTION Right 6/6/2019    RIGHT CERVICAL THREE FOUR, CERVICAL FOUR FIVE, CERVICAL FIVE SIX, CERVICAL SIX SEVEN FACET INJECTION SITE CONFIRMED BY FLUOROSCOPY performed by Shashi Blackmon MD at [facility] Right 7/18/2019    RIGHT CERVICAL FOUR, CERVICAL FIVE, CERVICAL SIX, CERVICAL SEVEN MEDIAL BRANCH BLOCK SITE CONFIRMED BY FLUOROSCOPY performed by Shashi Blackmon MD at [facility] Right 8/29/2019    RIGHT CERVICAL FOUR, CERVICAL FIVE, CERVICAL SIX, CERVICAL SEVEN RADIOFREQUENCY ABLATION SITE CONFIRMED BY FLUOROSCOPY performed by Shashi Blackmon MD at [facility]      complete    PAIN MANAGEMENT PROCEDURE N/A 2/20/2020    KNEE MEDIAL AND LATERAL INTRA-ARTICULAR INJECTION SITE CONFIRMED BY FLUOROSCOPY performed by Shashi Blackmon MD at [address] [facility] Right 7/29/2021    RIGHT LUMBAR THREE FOUR EPIDURAL STEROID INJECTION SITE CONFIRMED BY FLUOROSCOPY performed by Shashi Blackmon MD  Minutes of Exercise per Session: Not on file   Stress:     Feeling of Stress : Not on file   Social Connections:     Frequency of Communication with Friends and Family: Not on file    Frequency of Social Gatherings with Friends and Family: Not on file    Attends Mosque Services: Not on file    Active Member of [address] [place] [address] or Organizations: Not on file    Attends Club or Organization Meetings: Not on file    Marital Status: Not on file   Intimate Partner Violence:     Fear of Current or Ex-Partner: Not on file    Emotionally Abused: Not on file    Physically Abused: Not on file    Sexually Abused: Not on file   Housing Stability:     Unable to Pay for Housing in the Last Year: Not on file    Number of [place] in the Last Year: Not on file    Unstable Housing in the Last Year: Not on file       SCREENINGS             PHYSICAL EXAM    (up to 7 for level 4, 8 or more for level 5)     ED Triage Vitals [12/28/21 1237]   BP Temp Temp Source Pulse Resp SpO2 Height Weight   (!) 159/95 97 °F (36.1 °C) Oral 93 14 98 % 5' 4\" (1.626 m) 200 lb (90.7 kg)       Physical Exam  Vitals and nursing note reviewed. Constitutional:       General: She is awake. She is not in acute distress. Appearance: Normal appearance. She is well-developed. She is not ill-appearing, toxic-appearing or diaphoretic. HENT:      Head: Normocephalic and atraumatic. Right Ear: External ear normal.      Left Ear: External ear normal.      Nose: Nose normal.      Mouth/Throat:      Mouth: Mucous membranes are moist.   Eyes:      General:         Right eye: No discharge. Left eye: No discharge. Extraocular Movements: Extraocular movements intact. Conjunctiva/sclera: Conjunctivae normal.      Pupils: Pupils are equal, round, and reactive to light. Cardiovascular:      Rate and Rhythm: Normal rate and regular rhythm. Pulses: Normal pulses.            Radial pulses are 2+ on the right side and 2+ on the left side.        Posterior tibial pulses are 2+ on the right side and 2+ on the left side. Heart sounds: Normal heart sounds. No murmur heard. No friction rub. No gallop. Pulmonary:      Effort: Pulmonary effort is normal. No respiratory distress. Breath sounds: Normal breath sounds. No wheezing or rales. Musculoskeletal:      Cervical back: Normal range of motion and neck supple. No rigidity or tenderness. Lumbar back: Normal.      Left hip: Tenderness present. No deformity, lacerations or crepitus. Decreased range of motion. Right lower leg: No edema. Left lower leg: No edema. Legs:       Comments: Strength 5/5 symmetric to LE hip flexors, adductors, abductions, knee flexion and extension and ankle dorsiflexion plantar flexion. 2+ DTR  patella. Gait intact but painful. Skin:     General: Skin is warm and dry. Capillary Refill: Capillary refill takes less than 2 seconds. Neurological:      General: No focal deficit present. Mental Status: She is alert, oriented to person, place, and time and easily aroused. Sensory: No sensory deficit. Psychiatric:         Mood and Affect: Mood normal.         Behavior: Behavior normal. Behavior is cooperative. DIAGNOSTIC RESULTS   LABS:    Labs Reviewed - No data to display    All other labs were within normal range or not returned as of this dictation. EKG: All EKG's are interpreted by the Emergency Department Physician who either signs orCo-signs this chart in the absence of a cardiologist.  Please see their note for interpretation of EKG. RADIOLOGY:   Non-plain film images such as CT, Ultrasound and MRI are read by the radiologist. Plain radiographic images are visualized andpreliminarily interpreted by the  ED Provider with the below findings:        Interpretation perthe Radiologist below, if available at the time of this note:    CT HIP LEFT WO CONTRAST   Final Result   No acute osseous abnormality. XR HIP 2-3 VW W PELVIS LEFT   Final Result   No acute abnormality of the hip. CT HIP LEFT WO CONTRAST    Result Date: 12/28/2021  EXAMINATION: CT OF THE LEFT HIP WITHOUT CONTRAST 12/28/2021 3:35 pm TECHNIQUE: CT of the left hip was performed without the administration of intravenous contrast.  Multiplanar reformatted images are provided for review. Dose modulation, iterative reconstruction, and/or weight based adjustment of the mA/kV was utilized to reduce the radiation dose to as low as reasonably achievable. COMPARISON: Left hip radiographs 12/28/2021. CT abdomen and pelvis 07/25/2021. HISTORY ORDERING SYSTEM PROVIDED HISTORY: hip pain TECHNOLOGIST PROVIDED HISTORY: Reason for exam:->hip pain Decision Support Exception - unselect if not a suspected or confirmed emergency medical condition->Emergency Medical Condition (MA) Reason for Exam: Hip Pain (Patient reports left hip pain after using her granddaughter's hover board) FINDINGS: Bones: No fracture or dislocation. No suspicious lytic or blastic osseous lesion. Soft Tissue: The urinary bladder is normal in appearance. Status post hysterectomy. Mild sigmoid colon diverticulosis. No free fluid. No pelvic or inguinal lymphadenopathy. The visualized musculature is unremarkable. No abnormal soft tissue mass or fluid collection. Joint: Mild degenerative changes of the lower lumbar spine. The bilateral sacroiliac joints, bilateral hips, and pubic symphysis are unremarkable. No acute osseous abnormality. XR HIP 2-3 VW W PELVIS LEFT    Result Date: 12/28/2021  EXAMINATION: ONE XRAY VIEW OF THE PELVIS AND TWO XRAY VIEWS LEFT HIP 12/28/2021 1:04 pm COMPARISON: None. HISTORY: ORDERING SYSTEM PROVIDED HISTORY: left hip pain TECHNOLOGIST PROVIDED HISTORY: Reason for exam:->left hip pain Reason for Exam: hip pain, fell of hoverboard FINDINGS: The hip demonstrates normal alignment. No evidence of acute fracture. No focal osseus lesion. Pelvis is intact. No acute abnormality of the hip. PROCEDURES   Unless otherwise noted below, none     Procedures    CRITICAL CARE TIME   N/A    CONSULTS:  None      EMERGENCY DEPARTMENT COURSE and DIFFERENTIALDIAGNOSIS/MDM:   Vitals:    Vitals:    12/28/21 1237 12/28/21 1725   BP: (!) 159/95 122/74   Pulse: 93 89   Resp: 14 18   Temp: 97 °F (36.1 °C)    TempSrc: Oral    SpO2: 98% 96%   Weight: 200 lb (90.7 kg)    Height: 5' 4\" (1.626 m)        Patient was given thefollowing medications:  Medications   oxyCODONE-acetaminophen (PERCOCET) 5-325 MG per tablet 2 tablet (2 tablets Oral Given 12/28/21 1503)       PDMP Monitoring:    Last PDMP Kara March as Reviewed [facility]):  Review User Review Instant Review Result   Chavezyusef Pino 6/30/2021  8:45 AM Reviewed PDMP [1]     Last Controlled Substance Monitoring Documentation      ED from 9/5/2020 in [facility]  ED   Comments Pt left with discharge instructions, prescriptions x 2, and all belongings in hand. filed at 09/05/2020 1057        Urine Drug Screenings (1 yr)    No resulted procedures found. Medication Contract and Consent for Opioid Use Documents Filed      No documents found                MDM:   Patient seen and evaluated. Old records reviewed. Diagnostic testing reviewed and results discussed. I have independently evaluated this patient based upon my scope of practice. Supervising physician was in the department for consultation as needed. Patient is a 61 presents for evaluation of hip injury. Differentials include labral injury, hip strain, fracture, greater trochanter bursitis. X-ray negative. Patient still with severe pain with ambulation, CT obtained of the hip to rule out small occult fracture, no evidence of such. At this time I believe patient is most likely suffering from a labral injury.   She will require outpatient follow-up with orthopedics which may include an MRI however I do not believe she requires emergent MRI at this time. Patient discharged with walker and she notes that she feels safe navigating her home. At this time she will be discharged with continued home medications and strict ER return precautions. Based on patient's clinical history and clinical findings I currently estimate there is low probability for: compartment syndrome, DVT, septic arthritis, dislocation, surgically emergent fracture, tendon or NV injury. We have discussed the symptoms which are most concerning (e.g., changing or worsening pain, numbness, weakness) that necessitate immediate return. Pt is in agreement with the current plan and all questions were addressed. Discharge Time out:  CC Reviewed Yes   Test Results Yes     Vitals:    12/28/21 1725   BP: 122/74   Pulse: 89   Resp: 18   Temp:    SpO2: 96%              FINAL IMPRESSION      1. Left hip pain    2.  Strain of left hip, initial encounter          DISPOSITION/PLAN   DISPOSITION Decision To Discharge 12/28/2021 04:11:19 PM      PATIENT REFERREDTO:  Kelin Núñez MD  [address] [facility] [address] [facility]  [phone]    Schedule an appointment as soon as possible for a visit       [address]  [address] [place] [address]  [phone]  Go to   If symptoms worsen      DISCHARGE MEDICATIONS:  Discharge Medication List as of 12/28/2021  5:07 PM          DISCONTINUED MEDICATIONS:  Discharge Medication List as of 12/28/2021  5:07 PM                 (Please note that portions ofthis note were completed with a voice recognition program.  Efforts were made to edit the dictations but occasionally words are mis-transcribed.)    Brenda Beltre, [address] (electronically signed)        JENNIFER Lind  12/29/21 7570

## 2022-01-04 ENCOUNTER — OFFICE VISIT (OUTPATIENT)
Dept: ORTHOPEDIC SURGERY | Age: 57
End: 2022-01-04
Payer: COMMERCIAL

## 2022-01-04 VITALS — HEIGHT: 64 IN | WEIGHT: 200 LBS | BODY MASS INDEX: 34.15 KG/M2

## 2022-01-04 DIAGNOSIS — M25.552 LEFT HIP PAIN: Primary | ICD-10-CM

## 2022-01-04 PROCEDURE — G8417 CALC BMI ABV UP PARAM F/U: HCPCS | Performed by: PHYSICIAN ASSISTANT

## 2022-01-04 PROCEDURE — 99204 OFFICE O/P NEW MOD 45 MIN: CPT | Performed by: PHYSICIAN ASSISTANT

## 2022-01-04 PROCEDURE — G8484 FLU IMMUNIZE NO ADMIN: HCPCS | Performed by: PHYSICIAN ASSISTANT

## 2022-01-04 PROCEDURE — 4004F PT TOBACCO SCREEN RCVD TLK: CPT | Performed by: PHYSICIAN ASSISTANT

## 2022-01-04 PROCEDURE — G8427 DOCREV CUR MEDS BY ELIG CLIN: HCPCS | Performed by: PHYSICIAN ASSISTANT

## 2022-01-04 PROCEDURE — 3017F COLORECTAL CA SCREEN DOC REV: CPT | Performed by: PHYSICIAN ASSISTANT

## 2022-01-04 NOTE — PROGRESS NOTES
Dr Alverto Gomez      Date /Time 1/4/2022       3:13 PM EST  Name Edy Lee             1965   Location  [facility] DR Carmella Pino  MRN <[id number]>                Chief Complaint   Patient presents with    Hip Pain     LEFT HIP         History of Present Illness    Edy Lee is a 61 y.o. female who presents with  left hip pain. Sent in consultation by Jon Mckinley MD.      Injury Mechanism: Partial fall. Worker's Comp. & legal issues:   none. Previous Treatments: Ice, Heat and NSAIDs    Patient presents the office today for a new problem. Patient here with a chief complaint of left hip pain. When describing her pain is more over her lower lumbar spine lateral hip, groin, thigh and into her lower leg. She had an increase in her chronic low back symptoms after a partial fall off of hover board. Patient denies any weakness and bowel or bladder dysfunction. She is currently in pain management and he does see Dr. Tony Otto for periodic injections.     Past History  Past Medical History:   Diagnosis Date    Back problem     CMV (cytomegalovirus infection) status positive ([facility] [address]     Colitis     Degeneration of lumbar or lumbosacral intervertebral disc 09/23/2016    Depression     Fibromyalgia     GERD (gastroesophageal reflux disease)     Horseshoe kidney     HTN (hypertension)     Hyperlipidemia     Neuropathy     Sciatica      Past Surgical History:   Procedure Laterality Date    APPENDECTOMY      CARPAL TUNNEL RELEASE      CERVICAL FUSION N/A 10/27/2020    C6-7 ANTERIOR CERVICAL DISCECTOMY AND FUSION performed by Emilio Riddle MD at [address]  2011    COLONOSCOPY  8/29/2013    [facility]    ENDOSCOPY, COLON, DIAGNOSTIC  2011    EPIDURAL STEROID INJECTION Right 6/6/2019    RIGHT CERVICAL THREE FOUR, CERVICAL FOUR FIVE, CERVICAL FIVE SIX, CERVICAL SIX SEVEN FACET INJECTION SITE CONFIRMED BY FLUOROSCOPY performed by Nithya Tobar MD at [facility] Right 2019    RIGHT CERVICAL FOUR, CERVICAL FIVE, CERVICAL SIX, CERVICAL SEVEN MEDIAL BRANCH BLOCK SITE CONFIRMED BY FLUOROSCOPY performed by Nicola Sapp MD at [facility] Right 2019    RIGHT CERVICAL FOUR, CERVICAL FIVE, CERVICAL SIX, CERVICAL SEVEN RADIOFREQUENCY ABLATION SITE CONFIRMED BY FLUOROSCOPY performed by Nicola Sapp MD at [address] [facility]      complete    PAIN MANAGEMENT PROCEDURE N/A 2020    KNEE MEDIAL AND LATERAL INTRA-ARTICULAR INJECTION SITE CONFIRMED BY FLUOROSCOPY performed by Nicola Sapp MD at [address] Right 2021    RIGHT LUMBAR THREE FOUR EPIDURAL STEROID INJECTION SITE CONFIRMED BY FLUOROSCOPY performed by Nicola Sapp MD at [place] [address] ENDOSCOPY  2013    gastritis bx done     Family History   Problem Relation Age of Onset    Cancer Father     Heart Attack Father 72    Breast Cancer Mother     Other Mother         fibromyalgia    Cancer Sister         melanoma    Breast Cancer Maternal Grandmother      Social History     Tobacco Use    Smoking status: Current Every Day Smoker     Packs/day: 0.50     Years: 30.00     Pack years: 15.00     Types: Cigarettes     Last attempt to quit: 2020     Years since quittin.4    Smokeless tobacco: Never Used   Substance Use Topics    Alcohol use: No      Current Outpatient Medications on File Prior to Visit   Medication Sig Dispense Refill    carvedilol (COREG) 6.25 MG tablet       tiZANidine (ZANAFLEX) 4 MG capsule Take 1 capsule by mouth 3 times daily as needed for Muscle spasms 60 capsule 0     No current facility-administered medications on file prior to visit.         ASCVD 10-YEAR RISK SCORE  The 10-year ASCVD risk score (Myranda Covington, et al., 2013) is: 5.4%    Values used to calculate the score: []Not tested   []  []Not tested    Anterior instability apprehension  []  []Not tested   []  []Not tested    Posterior instability apprehension  []  []Not tested   []  []Not tested    Uncontained Internal rotation  []  []Not tested  []  []Not tested          Abductors  [x] All Neg  [] Not tested   [x] All Neg  [] Not tested    Medius strength  []  []Not tested   []  []Not tested    Minimum strength  []  []Not tested   []  []Not tested    IT band tendonitis  []  []Not tested   []  []Not tested    Trochanteric tenderness  []  []Not tested  []  []Not tested   Sciatic neuropathic pain  []  []Not tested   []  []Not tested           Post-arthroplasty  [] All Neg  [] Not tested   [] All Neg  [] Not tested    Rectus tendonitis  []  []Not tested   []  []Not tested    Iliopsoas tendonitis       Start-up pain  []  []Not tested   []  []Not tested          Imaging    Left Hip: [address] [facility]  Radiographs: X-rays ordered today reviewed of the left hip.  3 views. AP pelvis, lateral, and false profile. They demonstrate no evidence of fractures or dislocations with well-maintained joint space. CT results    EXAMINATION:   CT OF THE LEFT HIP WITHOUT CONTRAST 12/28/2021 3:35 pm       TECHNIQUE:   CT of the left hip was performed without the administration of intravenous   contrast.  Multiplanar reformatted images are provided for review.  Dose   modulation, iterative reconstruction, and/or weight based adjustment of the   mA/kV was utilized to reduce the radiation dose to as low as reasonably   achievable.       COMPARISON:   Left hip radiographs 12/28/2021.  CT abdomen and pelvis 07/25/2021.       HISTORY   ORDERING SYSTEM PROVIDED HISTORY: hip pain   TECHNOLOGIST PROVIDED HISTORY:   Reason for exam:->hip pain   Decision Support Exception - unselect if not a suspected or confirmed   emergency medical condition->Emergency Medical Condition (MA)   Reason for Exam: Hip Pain (Patient reports left hip pain after using her granddaughter's hover board)       FINDINGS:   Bones: No fracture or dislocation.  No suspicious lytic or blastic osseous   lesion.       Soft Tissue: The urinary bladder is normal in appearance.  Status post   hysterectomy.  Mild sigmoid colon diverticulosis.  No free fluid.  No pelvic   or inguinal lymphadenopathy.  The visualized musculature is unremarkable.  No   abnormal soft tissue mass or fluid collection.       Joint: Mild degenerative changes of the lower lumbar spine.  The bilateral   sacroiliac joints, bilateral hips, and pubic symphysis are unremarkable.           Impression   No acute osseous abnormality. Procedure:  Orders Placed This Encounter   Procedures    XR HIP LEFT (2-3 VIEWS)     Standing Status:   Future     Number of Occurrences:   1     Standing Expiration Date:   1/4/2023     Order Specific Question:   Reason for exam:     Answer:   PAIN       Assessment and Plan  Bridget Medina was seen today for hip pain. Diagnoses and all orders for this visit:    Left hip pain  -     XR HIP LEFT (2-3 VIEWS); Future        Patient is suffering from lumbar radiculopathy. I do not believe her hip is the primary cause of her pain. She may have a small labral tear but the majority of her symptoms are not caused by hip pathology. Would recommend she follow-up with her spine physician. She can see us back on appearing basis. Patient states she is a borderline diabetic but has not had it checked in an extended amount of time. I am on comfortable with oral steroids in her. I discussed with Adele Nieves that her history, symptoms, signs and imaging are most consistent with Lumbar radiculopathy    We reviewed the natural history of these conditions and treatment options ranging from conservative measures (rest, icing, activity modification, physical therapy, pain meds, cortisone injection) to surgical options.  .    In terms of treatment, I recommended continuing with rest, icing, avoidance of painful activities, NSAIDs or pain meds as tolerated, and physical therapy. If these are not effective, cortisone injection can be considered. We discussed surgical options as well, should conservative measures fail. Electronically signed by Kirk Buenrostro PA-C on 1/4/2022 at 3:13 PM  This dictation was generated by voice recognition computer software. Although all attempts are made to edit the dictation for accuracy, there may be errors in the transcription that are not intended.

## 2022-02-10 ENCOUNTER — HOSPITAL ENCOUNTER (OUTPATIENT)
Age: 57
Setting detail: OUTPATIENT SURGERY
Discharge: HOME | End: 2022-02-10
Attending: PAIN MEDICINE | Admitting: PAIN MEDICINE
Payer: COMMERCIAL

## 2022-02-10 VITALS
DIASTOLIC BLOOD PRESSURE: 88 MMHG | HEIGHT: 64 IN | OXYGEN SATURATION: 99 % | WEIGHT: 200 LBS | HEART RATE: 78 BPM | SYSTOLIC BLOOD PRESSURE: 150 MMHG | RESPIRATION RATE: 18 BRPM | BODY MASS INDEX: 34.15 KG/M2 | TEMPERATURE: 97.8 F

## 2022-02-10 PROCEDURE — 64483 NJX AA&/STRD TFRM EPI L/S 1: CPT | Performed by: PAIN MEDICINE

## 2022-02-10 PROCEDURE — 64484 NJX AA&/STRD TFRM EPI L/S EA: CPT | Performed by: PAIN MEDICINE

## 2022-02-10 PROCEDURE — 6360000004 HC RX CONTRAST MEDICATION: Performed by: PAIN MEDICINE

## 2022-02-10 PROCEDURE — 2500000003 HC RX 250 WO HCPCS: Performed by: PAIN MEDICINE

## 2022-02-10 PROCEDURE — 7100000010 HC PHASE II RECOVERY - FIRST 15 MIN: Performed by: PAIN MEDICINE

## 2022-02-10 PROCEDURE — 6360000002 HC RX W HCPCS: Performed by: PAIN MEDICINE

## 2022-02-10 PROCEDURE — 3600000012 HC SURGERY LEVEL 2 ADDTL 15MIN: Performed by: PAIN MEDICINE

## 2022-02-10 PROCEDURE — 2709999900 HC NON-CHARGEABLE SUPPLY: Performed by: PAIN MEDICINE

## 2022-02-10 PROCEDURE — 3600000002 HC SURGERY LEVEL 2 BASE: Performed by: PAIN MEDICINE

## 2022-02-10 RX ORDER — OXYCODONE AND ACETAMINOPHEN 10; 325 MG/1; MG/1
1 TABLET ORAL EVERY 6 HOURS PRN
COMMUNITY

## 2022-02-10 RX ORDER — ACETAMINOPHEN 500 MG
1000 TABLET ORAL EVERY 12 HOURS PRN
COMMUNITY

## 2022-02-10 RX ORDER — LIDOCAINE HYDROCHLORIDE 10 MG/ML
INJECTION, SOLUTION EPIDURAL; INFILTRATION; INTRACAUDAL; PERINEURAL PRN
Status: DISCONTINUED | OUTPATIENT
Start: 2022-02-10 | End: 2022-02-10 | Stop reason: ALTCHOICE

## 2022-02-10 ASSESSMENT — PAIN DESCRIPTION - DESCRIPTORS: DESCRIPTORS: CONSTANT;SHARP;ACHING

## 2022-02-10 ASSESSMENT — PAIN DESCRIPTION - PAIN TYPE: TYPE: CHRONIC PAIN

## 2022-02-10 ASSESSMENT — PAIN DESCRIPTION - PROGRESSION: CLINICAL_PROGRESSION: GRADUALLY IMPROVING

## 2022-02-10 ASSESSMENT — PAIN DESCRIPTION - LOCATION: LOCATION: BACK

## 2022-02-10 ASSESSMENT — PAIN SCALES - GENERAL: PAINLEVEL_OUTOF10: 5

## 2022-02-10 ASSESSMENT — PAIN - FUNCTIONAL ASSESSMENT
PAIN_FUNCTIONAL_ASSESSMENT: 0-10
PAIN_FUNCTIONAL_ASSESSMENT: PREVENTS OR INTERFERES WITH MANY ACTIVE NOT PASSIVE ACTIVITIES

## 2022-02-10 ASSESSMENT — PAIN DESCRIPTION - ORIENTATION: ORIENTATION: LOWER

## 2022-02-10 NOTE — PROCEDURES
Joel Shone is a 61 y.o. female patient. No diagnosis found. Past Medical History:   Diagnosis Date    Back problem     CMV (cytomegalovirus infection) status positive (HCC)     Colitis     Degeneration of lumbar or lumbosacral intervertebral disc 09/23/2016    Depression     Fibromyalgia     GERD (gastroesophageal reflux disease)     Horseshoe kidney     HTN (hypertension)     Hyperlipidemia     Neuropathy     Sciatica      Blood pressure (!) 146/85, pulse 81, temperature 98.6 °F (37 °C), temperature source Temporal, resp. rate 16, height 5' 4\" (1.626 m), weight 200 lb (90.7 kg), SpO2 99 %, not currently breastfeeding. Procedures    Parviz Aldana MD  2/10/2022  TRANSFORAMINAL EPIDURAL AT L L34  LEVELS  56554 and 85330 x  with [id number],     INDICATIONS:  Lumbar Radiculitis 724.4, M54.16  OPERATIVE PROCEDURE:  Consent was signed by the patient after the risks and benefits were explained. With the patient in the prone position, the back was prepped with Prevail and draped. Aseptic technique was used at every stage of the procedure. Oblique fluoroscopic guidance was used to visualize the pedicle of the _L3__ vertebral body on the _L__ side. Skin infiltration was with 0.5 cc of 1% Lidocaine using a 30-gauge needle followed by placement of a _22__ -gauge _5__ -inch needle using oblique fluoroscopic guidance into the transforaminal epidural space way under the pedicle at the medial aspect just lateral to SAP. Final needle position was checked in AP view, which was just lateral to the 6 oclock position on the pedicle. Aspiration was negative for CSF or blood . Injection of Omnipaque dye (0.5 cc) showed appropriate spread along the nerve root and also into the epidural space and _1__ cc of _1__ % Lidocaine with _4.5 mg of CELESTONE   The needle was pulled out intact and discharge instructions were given. ESTIMATED BLOOD LOSS:  Less than 1 cc      Pulse Ox Monitoring was done.      Exact similar procedure was done at _L L4__ level.            ________________________________                   Joselo Flower M.D.

## 2022-02-10 NOTE — PROGRESS NOTES
Discharge  instructions reviewed. Pt and family verbalize understanding with no further questions. VSS. Pt discharged via [facility] [address] to car. Assessment unchanged.

## 2023-10-30 ENCOUNTER — HOSPITAL ENCOUNTER (OUTPATIENT)
Age: 58
Discharge: HOME | End: 2023-10-30
Payer: COMMERCIAL

## 2023-10-30 ENCOUNTER — HOSPITAL ENCOUNTER (OUTPATIENT)
Dept: GENERAL RADIOLOGY | Age: 58
Discharge: HOME | End: 2023-10-30
Payer: COMMERCIAL

## 2023-10-30 DIAGNOSIS — M46.1 SACROILIITIS, NOT ELSEWHERE CLASSIFIED (HCC): ICD-10-CM

## 2023-10-30 DIAGNOSIS — M16.10 UNILATERAL PRIMARY OSTEOARTHRITIS, UNSPECIFIED HIP: ICD-10-CM

## 2023-10-30 PROCEDURE — 72202 X-RAY EXAM SI JOINTS 3/> VWS: CPT

## 2023-10-30 PROCEDURE — 73502 X-RAY EXAM HIP UNI 2-3 VIEWS: CPT

## 2024-05-03 ENCOUNTER — HOSPITAL ENCOUNTER (OUTPATIENT)
Dept: MRI IMAGING | Age: 59
Discharge: HOME | End: 2024-05-03
Payer: COMMERCIAL

## 2024-05-03 DIAGNOSIS — M54.16 LUMBAR RADICULOPATHY: ICD-10-CM

## 2024-05-03 PROCEDURE — 72148 MRI LUMBAR SPINE W/O DYE: CPT

## 2024-06-15 ENCOUNTER — HOSPITAL ENCOUNTER (OUTPATIENT)
Dept: MRI IMAGING | Age: 59
Discharge: HOME | End: 2024-06-15
Payer: COMMERCIAL

## 2024-06-15 DIAGNOSIS — M46.1 SACROILIITIS, NOT ELSEWHERE CLASSIFIED (HCC): ICD-10-CM

## 2024-06-15 PROCEDURE — 72195 MRI PELVIS W/O DYE: CPT

## 2024-07-06 ENCOUNTER — HOSPITAL ENCOUNTER (OUTPATIENT)
Dept: MRI IMAGING | Age: 59
Discharge: HOME | End: 2024-07-06
Payer: COMMERCIAL

## 2024-07-06 DIAGNOSIS — M16.10 UNILATERAL PRIMARY OSTEOARTHRITIS, UNSPECIFIED HIP: ICD-10-CM

## 2024-07-06 PROCEDURE — 73721 MRI JNT OF LWR EXTRE W/O DYE: CPT

## 2024-07-28 SDOH — HEALTH STABILITY: PHYSICAL HEALTH: ON AVERAGE, HOW MANY MINUTES DO YOU ENGAGE IN EXERCISE AT THIS LEVEL?: PATIENT DECLINED

## 2024-07-28 SDOH — HEALTH STABILITY: PHYSICAL HEALTH
ON AVERAGE, HOW MANY DAYS PER WEEK DO YOU ENGAGE IN MODERATE TO STRENUOUS EXERCISE (LIKE A BRISK WALK)?: PATIENT DECLINED

## 2024-08-28 ENCOUNTER — OFFICE VISIT (OUTPATIENT)
Dept: ORTHOPEDIC SURGERY | Age: 59
End: 2024-08-28
Payer: COMMERCIAL

## 2024-08-28 VITALS — BODY MASS INDEX: 33.8 KG/M2 | HEIGHT: 64 IN | WEIGHT: 198 LBS

## 2024-08-28 DIAGNOSIS — M54.16 LUMBAR RADICULOPATHY: Primary | Chronic | ICD-10-CM

## 2024-08-28 PROCEDURE — 4004F PT TOBACCO SCREEN RCVD TLK: CPT | Performed by: ORTHOPAEDIC SURGERY

## 2024-08-28 PROCEDURE — 3017F COLORECTAL CA SCREEN DOC REV: CPT | Performed by: ORTHOPAEDIC SURGERY

## 2024-08-28 PROCEDURE — 99213 OFFICE O/P EST LOW 20 MIN: CPT | Performed by: ORTHOPAEDIC SURGERY

## 2024-08-28 PROCEDURE — G8417 CALC BMI ABV UP PARAM F/U: HCPCS | Performed by: ORTHOPAEDIC SURGERY

## 2024-08-28 PROCEDURE — G8427 DOCREV CUR MEDS BY ELIG CLIN: HCPCS | Performed by: ORTHOPAEDIC SURGERY

## 2024-08-28 NOTE — PROGRESS NOTES
CHIEF COMPLAINT: Right hip pain.    History:   Mame Pastrana is a 58 y.o. female who presents today for evaluation and treatment of right hip pain / injury.  Patient was self-referred. This is evaluated as a personal injury.   She states that the pain began years ago.   Patient rates the pain as a 9/10.   Pain is located along her sacrum, buttock, posterior thigh.  She states that pain radiates around anteriorly toward her anterior hip.  She describes pain as stabbing.   Pain is worse with bending over, sleeping, driving, sitting.  Patient did not have a history of injury.   Patient does have a history of back pain.  She does have a history of sciatica.  She does see Dr. Hartley for her spine.  She has had different injections in the past.  She has had a spinal stimulator.  The patient has taken NSAIDs, without relief relief. The patient has not tried ice.  She has tried heat with relief  Patient has not had PT.  The patient has not had a hip injection.    She is on Percocet 10 mg, 4 times daily, which she states does not help.  She did previously see JENNIFER Colbert for her left hip, who believed her symptoms were from her spine.   The patient's occupation is on disability. She states she also works part time [employer].         Past Medical History:   Diagnosis Date    Back problem     CMV (cytomegalovirus infection) status positive (HCC)     Colitis     Degeneration of lumbar or lumbosacral intervertebral disc 09/23/2016    Depression     Fibromyalgia     GERD (gastroesophageal reflux disease)     Horseshoe kidney     HTN (hypertension)     Hyperlipidemia     Neuropathy     Sciatica        Past Surgical History:   Procedure Laterality Date    APPENDECTOMY      CARPAL TUNNEL RELEASE      CERVICAL FUSION N/A 10/27/2020    C6-7 ANTERIOR CERVICAL DISCECTOMY AND FUSION performed by Yair Angel MD at [facility] OR    CHOLECYSTECTOMY      COLONOSCOPY  2011    COLONOSCOPY  8/29/2013    [facility]    ENDOSCOPY,

## 2024-12-01 ENCOUNTER — HOSPITAL ENCOUNTER (OUTPATIENT)
Dept: GENERAL RADIOLOGY | Age: 59
Discharge: HOME | End: 2024-12-01
Payer: COMMERCIAL

## 2024-12-01 ENCOUNTER — HOSPITAL ENCOUNTER (OUTPATIENT)
Age: 59
Discharge: HOME | End: 2024-12-01
Payer: COMMERCIAL

## 2024-12-01 DIAGNOSIS — M47.814 THORACIC SPONDYLOSIS WITHOUT MYELOPATHY: ICD-10-CM

## 2024-12-01 PROCEDURE — 72070 X-RAY EXAM THORAC SPINE 2VWS: CPT

## 2024-12-19 ENCOUNTER — PROCEDURE VISIT (OUTPATIENT)
Dept: AUDIOLOGY | Age: 59
End: 2024-12-19
Payer: COMMERCIAL

## 2024-12-19 ENCOUNTER — OFFICE VISIT (OUTPATIENT)
Dept: ENT CLINIC | Age: 59
End: 2024-12-19
Payer: COMMERCIAL

## 2024-12-19 VITALS
HEART RATE: 92 BPM | WEIGHT: 198 LBS | HEIGHT: 64 IN | DIASTOLIC BLOOD PRESSURE: 98 MMHG | BODY MASS INDEX: 33.8 KG/M2 | SYSTOLIC BLOOD PRESSURE: 158 MMHG

## 2024-12-19 DIAGNOSIS — H65.21 RIGHT CHRONIC SEROUS OTITIS MEDIA: Primary | ICD-10-CM

## 2024-12-19 DIAGNOSIS — R09.81 NASAL CONGESTION: ICD-10-CM

## 2024-12-19 DIAGNOSIS — H93.11 TINNITUS OF RIGHT EAR: ICD-10-CM

## 2024-12-19 DIAGNOSIS — R09.82 POST-NASAL DRAINAGE: ICD-10-CM

## 2024-12-19 DIAGNOSIS — H93.8X1 EAR PRESSURE, RIGHT: ICD-10-CM

## 2024-12-19 DIAGNOSIS — H90.3 SENSORINEURAL HEARING LOSS (SNHL) OF BOTH EARS: ICD-10-CM

## 2024-12-19 DIAGNOSIS — H90.A31 MIXED CONDUCTIVE AND SENSORINEURAL HEARING LOSS OF RIGHT EAR WITH RESTRICTED HEARING OF LEFT EAR: Primary | ICD-10-CM

## 2024-12-19 DIAGNOSIS — H90.A22 SENSORINEURAL HEARING LOSS (SNHL) OF LEFT EAR WITH RESTRICTED HEARING OF RIGHT EAR: ICD-10-CM

## 2024-12-19 PROCEDURE — 99204 OFFICE O/P NEW MOD 45 MIN: CPT | Performed by: OTOLARYNGOLOGY

## 2024-12-19 PROCEDURE — 4004F PT TOBACCO SCREEN RCVD TLK: CPT | Performed by: OTOLARYNGOLOGY

## 2024-12-19 PROCEDURE — G8484 FLU IMMUNIZE NO ADMIN: HCPCS | Performed by: OTOLARYNGOLOGY

## 2024-12-19 PROCEDURE — 92567 TYMPANOMETRY: CPT | Performed by: AUDIOLOGIST

## 2024-12-19 PROCEDURE — 31231 NASAL ENDOSCOPY DX: CPT | Performed by: OTOLARYNGOLOGY

## 2024-12-19 PROCEDURE — 92557 COMPREHENSIVE HEARING TEST: CPT | Performed by: AUDIOLOGIST

## 2024-12-19 PROCEDURE — G8417 CALC BMI ABV UP PARAM F/U: HCPCS | Performed by: OTOLARYNGOLOGY

## 2024-12-19 PROCEDURE — 3017F COLORECTAL CA SCREEN DOC REV: CPT | Performed by: OTOLARYNGOLOGY

## 2024-12-19 PROCEDURE — G8427 DOCREV CUR MEDS BY ELIG CLIN: HCPCS | Performed by: OTOLARYNGOLOGY

## 2024-12-19 RX ORDER — CETIRIZINE HYDROCHLORIDE 10 MG/1
10 TABLET ORAL DAILY PRN
COMMUNITY

## 2024-12-19 RX ORDER — AZELASTINE 1 MG/ML
1 SPRAY, METERED NASAL 2 TIMES DAILY
Qty: 60 ML | Refills: 1 | Status: SHIPPED | OUTPATIENT
Start: 2024-12-19

## 2024-12-19 RX ORDER — ATORVASTATIN CALCIUM 20 MG/1
20 TABLET, FILM COATED ORAL NIGHTLY
COMMUNITY

## 2024-12-19 ASSESSMENT — ENCOUNTER SYMPTOMS
APNEA: 0
TROUBLE SWALLOWING: 0
SORE THROAT: 0
COUGH: 0
SINUS PRESSURE: 0
SHORTNESS OF BREATH: 0
EYE ITCHING: 0
FACIAL SWELLING: 0
VOICE CHANGE: 0

## 2024-12-19 NOTE — PROGRESS NOTES
Mame Pastrana   1965, 59 y.o. female   [id number]       Referring Provider: Rhonda Corbin DO  Referral Type: In an order in Epic    Reason for Visit: Evaluation of the cause of disorders of hearing, tinnitus, or balance.    ADULT AUDIOLOGIC EVALUATION      Mame Pastrana is a 59 y.o. female seen today, 12/19/2024 , for an initial audiologic evaluation.  Patient was seen by Rhonda Corbin DO following today's evaluation. Patient was alone.    AUDIOLOGIC AND OTHER PERTINENT MEDICAL HISTORY:      Mame Pastrana noted aural fullness, tinnitus, and history of occupational noise exposure.  Patient reports fullness and pulsatile tinnitus in the right ear coinciding with nasal congestion for the past couple of months.  She was on multiple rounds of antibiotics and steroids without improvement.  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    Mame Pastrana denied otalgia, dizziness, history of head trauma, history of ear surgery, and family history of hearing loss.    Date: 12/19/2024     IMPRESSIONS:      Abnormal middle ear pressure and compliance in the right ear and normal pressure and compliance in the left ear.  Abnormal hearing sensitivity, bilaterally, partially contributed from middle ear pathology in the right ear.  Word understanding was excellent presented at normal conversation level, bilaterally.  Follow medical recommendations of Rhonda Corbin DO.     ASSESSMENT AND FINDINGS:     Otoscopy revealed: Clear ear canals bilaterally    RIGHT EAR:  Hearing Sensitivity: Normal hearing sensitivity to a moderate mixed hearing loss from 250-8000 HZ  Speech Recognition Threshold: 20 dB HL  Word Recognition:Excellent (96%), based on NU-6 25-word list at 55 dBHL using recorded speech stimuli.    Tympanometry: Flat, no peak pressure or compliance, Type B tympanogram, with typical ear canal volume, consistent with middle ear

## 2024-12-19 NOTE — PATIENT INSTRUCTIONS
Afrin nasal spray 2 sprays each side for 3 nights    Start Astelin 1 spray each side twice a day with flonase 1 spray each side twice a day

## 2024-12-19 NOTE — PROGRESS NOTES
[facility]  [address], [address]  [place] [address]  P: [phone]       Patient     Mame Pastrana  1965    ChiefComplaint     Chief Complaint   Patient presents with    New Patient    Hearing Problem     2.5 mo.      Sinus Problem     Given antibiotic and steroids not any better. Last dose 2 wks ago.         History of Present Illness     Mame is a 59-year-old female here today for evaluation of right sided hearing loss.  States she had URI 2.5 months ago at that time significant nasal congestion drainage and right sided hearing loss.  Has been treated with multiple rounds of oral steroids and antibiotics all without improvement.  Has been using Flonase as well as oral antihistamine all without benefit.  No history of similar symptoms.    Past Medical History     Past Medical History:   Diagnosis Date    Back problem     CMV (cytomegalovirus infection) status positive (HCC)     Colitis     Degeneration of lumbar or lumbosacral intervertebral disc 09/23/2016    Depression     Fibromyalgia     GERD (gastroesophageal reflux disease)     Horseshoe kidney     HTN (hypertension)     Hyperlipidemia     Neuropathy     Sciatica        Past Surgical History     Past Surgical History:   Procedure Laterality Date    APPENDECTOMY      CARPAL TUNNEL RELEASE      CERVICAL FUSION N/A 10/27/2020    C6-7 ANTERIOR CERVICAL DISCECTOMY AND FUSION performed by Yair Angel MD at [facility] OR    CHOLECYSTECTOMY      COLONOSCOPY  2011    COLONOSCOPY  8/29/2013    [facility]    ENDOSCOPY, COLON, DIAGNOSTIC  2011    EPIDURAL STEROID INJECTION Right 6/6/2019    RIGHT CERVICAL THREE FOUR, CERVICAL FOUR FIVE, CERVICAL FIVE SIX, CERVICAL SIX SEVEN FACET INJECTION SITE CONFIRMED BY FLUOROSCOPY performed by Daniel Anton MD at [facility] OR    EPIDURAL STEROID INJECTION Right 7/18/2019    RIGHT CERVICAL FOUR, CERVICAL FIVE, CERVICAL SIX, CERVICAL SEVEN MEDIAL BRANCH BLOCK SITE CONFIRMED BY

## 2024-12-20 ENCOUNTER — PREP FOR PROCEDURE (OUTPATIENT)
Dept: ENT CLINIC | Age: 59
End: 2024-12-20

## 2024-12-20 DIAGNOSIS — H65.21 CHRONIC SEROUS OTITIS MEDIA OF RIGHT EAR: ICD-10-CM

## 2025-02-03 RX ORDER — CYANOCOBALAMIN (VITAMIN B-12) 500 MCG
2500 TABLET ORAL DAILY
COMMUNITY

## 2025-02-04 NOTE — PROGRESS NOTES

## 2025-02-11 ENCOUNTER — TELEPHONE (OUTPATIENT)
Dept: ENT CLINIC | Age: 60
End: 2025-02-11

## 2025-02-11 ENCOUNTER — HOSPITAL ENCOUNTER (OUTPATIENT)
Age: 60
Discharge: HOME | End: 2025-02-11
Payer: COMMERCIAL

## 2025-02-11 LAB
EKG ATRIAL RATE: 77 BPM
EKG DIAGNOSIS: NORMAL
EKG P AXIS: 37 DEGREES
EKG P-R INTERVAL: 186 MS
EKG Q-T INTERVAL: 374 MS
EKG QRS DURATION: 84 MS
EKG QTC CALCULATION (BAZETT): 423 MS
EKG R AXIS: 23 DEGREES
EKG T AXIS: 52 DEGREES
EKG VENTRICULAR RATE: 77 BPM

## 2025-02-11 PROCEDURE — 93005 ELECTROCARDIOGRAM TRACING: CPT | Performed by: NURSE PRACTITIONER

## 2025-02-12 ENCOUNTER — ANESTHESIA EVENT (OUTPATIENT)
Dept: OPERATING ROOM | Age: 60
End: 2025-02-12
Payer: COMMERCIAL

## 2025-02-12 NOTE — ANESTHESIA PRE PROCEDURE
Department of Anesthesiology  Preprocedure Note       Name:  Mame Pastrana   Age:  59 y.o.  :  1965                                          MRN:  [id number]         Date:  2025      Surgeon: Surgeon(s):  Rhonda Corbin DO    Procedure: Procedure(s):  RIGHT MYRINGOTOMY TUBE INSERTION    Medications prior to admission:   Prior to Admission medications    Medication Sig Start Date End Date Taking? Authorizing Provider   metFORMIN (GLUCOPHAGE) 500 MG tablet Take 1 tablet by mouth 2 times daily (with meals)   Yes Cameron Castañeda MD   Cyanocobalamin (VITAMIN B 12) 500 MCG TABS Take 2,500 mg by mouth daily   Yes Cameron Castañeda MD   atorvastatin (LIPITOR) 20 MG tablet Take 1 tablet by mouth nightly    Cameron Castañeda MD   cetirizine (ZYRTEC) 10 MG tablet Take 1 tablet by mouth daily as needed    Cmaeron Castañeda MD   tiZANidine (ZANAFLEX) 4 MG tablet  24   Cameron Castañeda MD   azelastine (ASTELIN) 0.1 % nasal spray 1 spray by Nasal route 2 times daily Use in each nostril as directed 24   Rhonda Corbin DO   oxyCODONE-acetaminophen (PERCOCET)  MG per tablet Take 1 tablet by mouth every 6 hours as needed for Pain.  Patient not taking: Reported on 2/3/2025    Cameron Castañeda MD   acetaminophen (TYLENOL) 500 MG tablet Take 2 tablets by mouth every 12 hours as needed for Pain    Cameron Castañeda MD   carvedilol (COREG) 6.25 MG tablet Take 1 tablet by mouth daily 21   Cameron Castañeda MD   tiZANidine (ZANAFLEX) 4 MG capsule Take 1 capsule by mouth 3 times daily as needed for Muscle spasms 10/27/20   Yair Angel MD       Current medications:    No current facility-administered medications for this encounter.     Current Outpatient Medications   Medication Sig Dispense Refill    metFORMIN (GLUCOPHAGE) 500 MG tablet Take 1 tablet by mouth 2 times daily (with meals)      Cyanocobalamin (VITAMIN B 12) 500 MCG TABS Take 2,500 mg by mouth daily

## 2025-02-13 ENCOUNTER — ANESTHESIA (OUTPATIENT)
Dept: OPERATING ROOM | Age: 60
End: 2025-02-13
Payer: COMMERCIAL

## 2025-02-13 ENCOUNTER — HOSPITAL ENCOUNTER (OUTPATIENT)
Age: 60
Setting detail: OUTPATIENT SURGERY
Discharge: HOME | End: 2025-02-13
Attending: OTOLARYNGOLOGY | Admitting: OTOLARYNGOLOGY
Payer: COMMERCIAL

## 2025-02-13 VITALS
OXYGEN SATURATION: 95 % | DIASTOLIC BLOOD PRESSURE: 64 MMHG | TEMPERATURE: 97.6 F | RESPIRATION RATE: 16 BRPM | WEIGHT: 204 LBS | HEIGHT: 64 IN | SYSTOLIC BLOOD PRESSURE: 106 MMHG | HEART RATE: 80 BPM | BODY MASS INDEX: 34.83 KG/M2

## 2025-02-13 LAB
GLUCOSE BLD-MCNC: 132 MG/DL (ref 70–99)
PERFORMED ON: ABNORMAL

## 2025-02-13 PROCEDURE — 6360000002 HC RX W HCPCS

## 2025-02-13 PROCEDURE — 2580000003 HC RX 258: Performed by: ANESTHESIOLOGY

## 2025-02-13 PROCEDURE — L8699 PROSTHETIC IMPLANT NOS: HCPCS | Performed by: OTOLARYNGOLOGY

## 2025-02-13 PROCEDURE — 3600000002 HC SURGERY LEVEL 2 BASE: Performed by: OTOLARYNGOLOGY

## 2025-02-13 PROCEDURE — 6370000000 HC RX 637 (ALT 250 FOR IP): Performed by: OTOLARYNGOLOGY

## 2025-02-13 PROCEDURE — 7100000011 HC PHASE II RECOVERY - ADDTL 15 MIN: Performed by: OTOLARYNGOLOGY

## 2025-02-13 PROCEDURE — 2709999900 HC NON-CHARGEABLE SUPPLY: Performed by: OTOLARYNGOLOGY

## 2025-02-13 PROCEDURE — 7100000010 HC PHASE II RECOVERY - FIRST 15 MIN: Performed by: OTOLARYNGOLOGY

## 2025-02-13 PROCEDURE — 3700000000 HC ANESTHESIA ATTENDED CARE: Performed by: OTOLARYNGOLOGY

## 2025-02-13 DEVICE — VENT TUBE 1010201 5PK BOBBIN 1.14 FLPL
Type: IMPLANTABLE DEVICE | Site: EAR | Status: FUNCTIONAL
Brand: REUTER

## 2025-02-13 RX ORDER — SODIUM CHLORIDE 0.9 % (FLUSH) 0.9 %
5-40 SYRINGE (ML) INJECTION PRN
Status: DISCONTINUED | OUTPATIENT
Start: 2025-02-13 | End: 2025-02-13 | Stop reason: HOSPADM

## 2025-02-13 RX ORDER — SODIUM CHLORIDE 0.9 % (FLUSH) 0.9 %
5-40 SYRINGE (ML) INJECTION EVERY 12 HOURS SCHEDULED
Status: DISCONTINUED | OUTPATIENT
Start: 2025-02-13 | End: 2025-02-13 | Stop reason: HOSPADM

## 2025-02-13 RX ORDER — MEPERIDINE HYDROCHLORIDE 25 MG/ML
12.5 INJECTION INTRAMUSCULAR; INTRAVENOUS; SUBCUTANEOUS EVERY 5 MIN PRN
Status: DISCONTINUED | OUTPATIENT
Start: 2025-02-13 | End: 2025-02-13 | Stop reason: HOSPADM

## 2025-02-13 RX ORDER — ONDANSETRON 2 MG/ML
4 INJECTION INTRAMUSCULAR; INTRAVENOUS
Status: DISCONTINUED | OUTPATIENT
Start: 2025-02-13 | End: 2025-02-13 | Stop reason: HOSPADM

## 2025-02-13 RX ORDER — LABETALOL HYDROCHLORIDE 5 MG/ML
5 INJECTION, SOLUTION INTRAVENOUS EVERY 10 MIN PRN
Status: DISCONTINUED | OUTPATIENT
Start: 2025-02-13 | End: 2025-02-13 | Stop reason: HOSPADM

## 2025-02-13 RX ORDER — LIDOCAINE HYDROCHLORIDE 20 MG/ML
INJECTION, SOLUTION INFILTRATION; PERINEURAL
Status: DISCONTINUED | OUTPATIENT
Start: 2025-02-13 | End: 2025-02-13 | Stop reason: SDUPTHER

## 2025-02-13 RX ORDER — OXYCODONE HYDROCHLORIDE 5 MG/1
5 TABLET ORAL PRN
Status: DISCONTINUED | OUTPATIENT
Start: 2025-02-13 | End: 2025-02-13 | Stop reason: HOSPADM

## 2025-02-13 RX ORDER — PROPOFOL 10 MG/ML
INJECTION, EMULSION INTRAVENOUS
Status: DISCONTINUED | OUTPATIENT
Start: 2025-02-13 | End: 2025-02-13 | Stop reason: SDUPTHER

## 2025-02-13 RX ORDER — DIPHENHYDRAMINE HYDROCHLORIDE 50 MG/ML
12.5 INJECTION INTRAMUSCULAR; INTRAVENOUS
Status: DISCONTINUED | OUTPATIENT
Start: 2025-02-13 | End: 2025-02-13 | Stop reason: HOSPADM

## 2025-02-13 RX ORDER — MIDAZOLAM HYDROCHLORIDE 1 MG/ML
INJECTION, SOLUTION INTRAMUSCULAR; INTRAVENOUS
Status: DISCONTINUED | OUTPATIENT
Start: 2025-02-13 | End: 2025-02-13 | Stop reason: SDUPTHER

## 2025-02-13 RX ORDER — LIDOCAINE HYDROCHLORIDE 10 MG/ML
0.3 INJECTION, SOLUTION EPIDURAL; INFILTRATION; INTRACAUDAL; PERINEURAL
Status: DISCONTINUED | OUTPATIENT
Start: 2025-02-13 | End: 2025-02-13 | Stop reason: HOSPADM

## 2025-02-13 RX ORDER — OXYCODONE HYDROCHLORIDE 5 MG/1
10 TABLET ORAL PRN
Status: DISCONTINUED | OUTPATIENT
Start: 2025-02-13 | End: 2025-02-13 | Stop reason: HOSPADM

## 2025-02-13 RX ORDER — SODIUM CHLORIDE 9 MG/ML
INJECTION, SOLUTION INTRAVENOUS PRN
Status: DISCONTINUED | OUTPATIENT
Start: 2025-02-13 | End: 2025-02-13 | Stop reason: HOSPADM

## 2025-02-13 RX ORDER — FENTANYL CITRATE 50 UG/ML
INJECTION, SOLUTION INTRAMUSCULAR; INTRAVENOUS
Status: DISCONTINUED | OUTPATIENT
Start: 2025-02-13 | End: 2025-02-13 | Stop reason: SDUPTHER

## 2025-02-13 RX ORDER — SODIUM CHLORIDE, SODIUM LACTATE, POTASSIUM CHLORIDE, CALCIUM CHLORIDE 600; 310; 30; 20 MG/100ML; MG/100ML; MG/100ML; MG/100ML
INJECTION, SOLUTION INTRAVENOUS CONTINUOUS
Status: DISCONTINUED | OUTPATIENT
Start: 2025-02-13 | End: 2025-02-13 | Stop reason: HOSPADM

## 2025-02-13 RX ORDER — CIPROFLOXACIN HYDROCHLORIDE 3.5 MG/ML
SOLUTION/ DROPS TOPICAL PRN
Status: DISCONTINUED | OUTPATIENT
Start: 2025-02-13 | End: 2025-02-13 | Stop reason: ALTCHOICE

## 2025-02-13 RX ORDER — NALOXONE HYDROCHLORIDE 0.4 MG/ML
INJECTION, SOLUTION INTRAMUSCULAR; INTRAVENOUS; SUBCUTANEOUS PRN
Status: DISCONTINUED | OUTPATIENT
Start: 2025-02-13 | End: 2025-02-13 | Stop reason: HOSPADM

## 2025-02-13 RX ADMIN — FENTANYL CITRATE 50 MCG: 50 INJECTION INTRAMUSCULAR; INTRAVENOUS at 07:25

## 2025-02-13 RX ADMIN — MIDAZOLAM 2 MG: 1 INJECTION INTRAMUSCULAR; INTRAVENOUS at 07:25

## 2025-02-13 RX ADMIN — PROPOFOL 70 MG: 10 INJECTION, EMULSION INTRAVENOUS at 07:29

## 2025-02-13 RX ADMIN — SODIUM CHLORIDE, POTASSIUM CHLORIDE, SODIUM LACTATE AND CALCIUM CHLORIDE: 600; 310; 30; 20 INJECTION, SOLUTION INTRAVENOUS at 07:26

## 2025-02-13 RX ADMIN — LIDOCAINE HYDROCHLORIDE 60 MG: 20 INJECTION, SOLUTION INFILTRATION; PERINEURAL at 07:29

## 2025-02-13 ASSESSMENT — PAIN - FUNCTIONAL ASSESSMENT
PAIN_FUNCTIONAL_ASSESSMENT: WONG-BAKER FACES
PAIN_FUNCTIONAL_ASSESSMENT: WONG-BAKER FACES
PAIN_FUNCTIONAL_ASSESSMENT: NONE - DENIES PAIN

## 2025-02-13 NOTE — DISCHARGE INSTRUCTIONS
Home Care Instructions for Tympanostomy Tube (Ear Tubes) Placement       1. Ciloxin Otic Drops: 4 drops to BOTH ears, 2 times daily, x 3 days after Surgery.   2. Advance diet as tolerated to age-appropriate diet.   3. Activity as tolerated.   4. Tylenol and Children's Motrin for pain as directed by instructions on the medication bottle.   5. Return to the office as scheduled for your post-operative hearing test  6. Please Contact Dr. Corbin's office with any questions or concerns: [phone].    ANESTHESIA DISCHARGE INSTRUCTIONS    You are under the influence of drugs- do not drink alcohol, drive a car, operate machinery(such as power tools, kitchen appliances, etc), sign legal documents, or make any important decisions for 24 hours (or while on pain medications).   Children should not ride bikes or skate boards or play on gym sets  for 24 hours after surgery.  A responsible adult should be with you for 24 hours.  Rest at home today- increase activity as tolerated.  Progress slowly to a regular diet unless your physician has instructed you otherwise. Drink plenty of water.    CALL YOUR DOCTOR IF YOU:  Have moderate to severe nausea or vomiting AND are unable to hold down fluids or prescribed medications.  Have bright red bloody drainage from your dressing that won't stop oozing.  Do not get relief with your pain medication    NORMAL (POSSIBLE) SIDE EFFECTS FROM ANESTHESIA:     Confusion, temporary memory loss, delayed reaction times in the first 24 hours  Lightheadedness, dizziness, difficulty focusing, blurred vision  Nausea/vomiting can happen  Shivering, feeling cold, sore throat, cough and muscle aches should stop within 24-48 hours  Trouble urinating - call your surgeon if it has been more than 8 hrs  Bruising or soreness at the IV site - call if it remains red, firm or there is drainage             FEMALES OF CHILDBEARING AGE WHO ARE TAKING BIRTH CONTROL PILLS:  You may have received a medication during your

## 2025-02-13 NOTE — H&P
[facility]  [address], [address]  [place] [address]  P: [phone]        Patient      Mame Pastrana  1965     ChiefComplaint           Chief Complaint   Patient presents with    New Patient    Hearing Problem       2.5 mo.      Sinus Problem       Given antibiotic and steroids not any better. Last dose 2 wks ago.           History of Present Illness      Mame is a 59-year-old female here today for evaluation of right sided hearing loss.  States she had URI 2.5 months ago at that time significant nasal congestion drainage and right sided hearing loss.  Has been treated with multiple rounds of oral steroids and antibiotics all without improvement.  Has been using Flonase as well as oral antihistamine all without benefit.  No history of similar symptoms.     Past Medical History      Past Medical History        Past Medical History:   Diagnosis Date    Back problem      CMV (cytomegalovirus infection) status positive (HCC)      Colitis      Degeneration of lumbar or lumbosacral intervertebral disc 09/23/2016    Depression      Fibromyalgia      GERD (gastroesophageal reflux disease)      Horseshoe kidney      HTN (hypertension)      Hyperlipidemia      Neuropathy      Sciatica              Past Surgical History      Past Surgical History         Past Surgical History:   Procedure Laterality Date    APPENDECTOMY        CARPAL TUNNEL RELEASE        CERVICAL FUSION N/A 10/27/2020     C6-7 ANTERIOR CERVICAL DISCECTOMY AND FUSION performed by Yair Angel MD at [facility] OR    CHOLECYSTECTOMY        COLONOSCOPY   2011    COLONOSCOPY   8/29/2013     [facility]    ENDOSCOPY, COLON, DIAGNOSTIC   2011    EPIDURAL STEROID INJECTION Right 6/6/2019     RIGHT CERVICAL THREE FOUR, CERVICAL FOUR FIVE, CERVICAL FIVE SIX, CERVICAL SIX SEVEN FACET INJECTION SITE CONFIRMED BY FLUOROSCOPY performed by Daniel Anton MD at [facility] OR    EPIDURAL STEROID INJECTION Right 7/18/2019     RIGHT

## 2025-02-13 NOTE — OP NOTE
Operative Note      Patient: Mame Pastrana  YOB: 1965  MRN: [id number]    Date of Procedure: 2/13/2025    Pre-Op Diagnosis Codes:      * Chronic serous otitis media of right ear [H65.21]    Post-Op Diagnosis: Same       Procedure(s):  RIGHT MYRINGOTOMY TUBE INSERTION    Surgeon(s):  Rhonda Corbin DO    Assistant:   Surgical Assistant: Citlaly Almendarez    Anesthesia: Monitor Anesthesia Care    Estimated Blood Loss (mL): 1mL    Complications: None    Specimens:   * No specimens in log *    Implants:  Implant Name Type Inv. Item Serial No.  Lot No. LRB No. Used Action   TUBE INNR EAR MYR VENT REUT KIRIT W/ FLNG H 1.14 MM ID - [id number]  TUBE INNR EAR MYR VENT REUT KIRIT W/ FLNG H 1.14 MM ID  [id number] Right 1 Implanted         Drains: * No LDAs found *    Findings:  Infection Present At Time Of Surgery (PATOS) (choose all levels that have infection present):  No infection present  Other Findings: right serous otitis media    Description of procedure:   Consent was confirmed in the pre-operative area after discussion concerning the risk, benefits, and alternatives of the procedure were discussed with the patient. Patient was brought to the OR by the anesthesia team. Monitored anesthesia care was induced. Time out was performed and agreed upon by the entirety of the operating room staff.     The patient's right EAC and tympanic membrane were visualized with the use of an otologic speculum and the microscope.   A myringotomy knife was used to make a radial incision along the anterior-inferior quadrant of the tympanic membrane.  After myringotomy a #3 suction was used to investigate and evacuate the middle ear space contents, see findings above.  After this was completely evacuated a collar button type tympanostomy tube was placed into the myringotomy site.  4 drops of topical ciprofloxacin was placed in external auditory canal and a cottonball was placed at the 
Patient calling stating she was on vacation in [place] and had a Right bottom of the foot wound.  Patient states it is draining \"coupious amounts of fluid, puss\"  Patient states she now has a bone infection and is running fevers.  Patient states they are returning home today because of the infection.    Patient asking if she makes appointment with Dr Peterson or wound care?  Patient is requesting a call back to discuss this.  
WDL

## 2025-02-13 NOTE — ANESTHESIA POSTPROCEDURE EVALUATION
Department of Anesthesiology  Postprocedure Note    Patient: Mame Pastrana  MRN: [id number]  YOB: 1965  Date of evaluation: 2/13/2025    Procedure Summary       Date: 02/13/25 Room / Location: [address]    Anesthesia Start: 0726 Anesthesia Stop: 0735    Procedure: RIGHT MYRINGOTOMY TUBE INSERTION (Right: Ear) Diagnosis:       Chronic serous otitis media of right ear      (Chronic serous otitis media of right ear [H65.21])    Surgeons: Rhonda Corbin DO Responsible Provider: Antonio Teixeira MD    Anesthesia Type: general ASA Status: 2            Anesthesia Type: No value filed.    Ju Phase I: Ju Score: 10    Ju Phase II: Ju Score: 10    Anesthesia Post Evaluation    Comments: Postoperative Anesthesia Note    Name:    Mame Pastrana  MRN:      [id number]    Patient Vitals in the past 12 hrs:  02/13/25 0819, BP:106/64, Pulse:80, Resp:16, SpO2:95 %  02/13/25 0801, BP:103/63, Temp:97.6 °F (36.4 °C), Temp src:Temporal, Pulse:78, Resp:16, SpO2:94 %  02/13/25 0753, BP:(!) 100/57, Pulse:81, Resp:16, SpO2:97 %  02/13/25 0747, BP:(!) 111/52, Pulse:81, Resp:16, SpO2:92 %  02/13/25 0738, BP:(!) 108/57, Temp:98 °F (36.7 °C), Temp src:Temporal, Pulse:83, Resp:16, SpO2:95 %  02/13/25 0631, BP:(!) 107/58, Temp:98.3 °F (36.8 °C), Temp src:Temporal, Pulse:94, Resp:16, SpO2:94 %, Height:1.626 m (5' 4\"), Weight:92.5 kg (204 lb)     LABS:    CBC  Lab Results       Component                Value               Date/Time                  WBC                      8.1                 07/25/2021 05:55 PM        HGB                      13.8                07/25/2021 05:55 PM        HCT                      40.4                07/25/2021 05:55 PM        PLT                      207                 07/25/2021 05:55 PM   RENAL  Lab Results       Component                Value               Date/Time                  NA                       138                 07/25/2021

## 2025-02-26 ENCOUNTER — OFFICE VISIT (OUTPATIENT)
Dept: ENT CLINIC | Age: 60
End: 2025-02-26

## 2025-02-26 VITALS
HEIGHT: 64 IN | WEIGHT: 204 LBS | BODY MASS INDEX: 34.83 KG/M2 | HEART RATE: 84 BPM | SYSTOLIC BLOOD PRESSURE: 146 MMHG | DIASTOLIC BLOOD PRESSURE: 84 MMHG

## 2025-02-26 DIAGNOSIS — Z96.22 S/P TYMPANOSTOMY TUBE PLACEMENT: Primary | ICD-10-CM

## 2025-02-26 PROCEDURE — 99024 POSTOP FOLLOW-UP VISIT: CPT | Performed by: OTOLARYNGOLOGY

## 2025-02-26 NOTE — PROGRESS NOTES
Jennifer is a 59-year-old female here today for 2-week follow-up right PE tube placement.  States she is noticed no change in hearing.    Right PE tube in place and patent middle ear clear      Reviewed that serous fluid was removed from the middle ear space at time of PE tube placement.  Will plan for audiogram for further evaluation.

## 2025-02-27 ENCOUNTER — TELEPHONE (OUTPATIENT)
Dept: ENT CLINIC | Age: 60
End: 2025-02-27

## 2025-02-27 ENCOUNTER — PROCEDURE VISIT (OUTPATIENT)
Dept: AUDIOLOGY | Age: 60
End: 2025-02-27
Payer: COMMERCIAL

## 2025-02-27 DIAGNOSIS — H65.21 CHRONIC SEROUS OTITIS MEDIA OF RIGHT EAR: ICD-10-CM

## 2025-02-27 DIAGNOSIS — H93.8X1 EAR PRESSURE, RIGHT: ICD-10-CM

## 2025-02-27 DIAGNOSIS — H90.3 SENSORINEURAL HEARING LOSS, BILATERAL: Primary | ICD-10-CM

## 2025-02-27 DIAGNOSIS — Z96.22 S/P TYMPANOSTOMY TUBE PLACEMENT: Primary | ICD-10-CM

## 2025-02-27 PROCEDURE — 92567 TYMPANOMETRY: CPT | Performed by: AUDIOLOGIST

## 2025-02-27 PROCEDURE — 92557 COMPREHENSIVE HEARING TEST: CPT | Performed by: AUDIOLOGIST

## 2025-02-27 NOTE — TELEPHONE ENCOUNTER
Please let Mame know that I reviewed her hearing test from today.  The hearing test confirms that the right PE tube is in place and open.  Her hearing is the same in the right ear as it is in the left.  She has a bilateral sensorineural hearing loss in both ears, if this is bothersome to her the neck step would be to consider hearing aids which she would need to go through [facility] for given insurance.

## 2025-02-27 NOTE — PROGRESS NOTES
Mame Pastrana   1965, 59 y.o. female   [id number]       Referring Provider: Rhonda Corbin DO  Referral Type: In an order in Epic    Reason for Visit: Evaluation of suspected change in hearing, tinnitus, or balance.    ADULT AUDIOLOGIC EVALUATION      Mame Pastrana is a 59 y.o. female seen today, 2/27/2025 , for a recheck audiologic evaluation.  Patient was alone.    AUDIOLOGIC AND OTHER PERTINENT MEDICAL HISTORY:      Mame Pastrana noted aural fullness.  Patient reports no change in hearing since pressure equalizing tube placement and previous audiologic evaluation on 12/19/2024.  She noted a fullness sensation in the right ear.    Mame Pastrana denied otalgia, tinnitus, and dizziness.    Date: 2/27/2025     IMPRESSIONS:      Abnormal middle ear pressure and compliance with a large ear canal volume indicating a patent pressure equalizing tube in the right ear.  Normal middle ear pressure and compliance in the left ear.  Abnormal hearing sensitivity, bilaterally, which can affect difficult listening environments.  Word understanding was excellent presented at normal conversation level, bilaterally.  Slight improvement in right ear hearing since previous audiologic evaluation.  Explained to patient multiple times the conductive component was not significant prior to the pressure equalizing tube placement and a significant improvement in hearing was not expected post pressure equalizing tube placement.  Follow medical recommendations of Rhonda Corbin DO.     ASSESSMENT AND FINDINGS:     Otoscopy revealed: Clear ear canals bilaterally    RIGHT EAR:  Hearing Sensitivity: Normal hearing sensitivity to a moderate sensorineural hearing loss from 250-8000 Hz  Speech Recognition Threshold: 15 dB HL  Word Recognition:Excellent (96%), based on NU-6 25-word list at 50 dBHL using recorded speech stimuli.    Tympanometry: Flat, no peak pressure or compliance, Type B tympanogram, with large ear canal volume,

## 2025-05-09 ENCOUNTER — TRANSCRIBE ORDERS (OUTPATIENT)
Dept: ADMINISTRATIVE | Age: 60
End: 2025-05-09

## 2025-05-09 DIAGNOSIS — M54.14 RADICULOPATHY, THORACIC REGION: Primary | ICD-10-CM

## 2025-06-03 ENCOUNTER — TRANSCRIBE ORDERS (OUTPATIENT)
Dept: ADMINISTRATIVE | Age: 60
End: 2025-06-03

## 2025-06-03 DIAGNOSIS — M54.14 THORACIC RADICULOPATHY: Primary | ICD-10-CM

## 2025-06-06 ENCOUNTER — HOSPITAL ENCOUNTER (OUTPATIENT)
Dept: MRI IMAGING | Age: 60
Discharge: HOME | End: 2025-06-06
Payer: COMMERCIAL

## 2025-06-06 DIAGNOSIS — M54.14 THORACIC RADICULOPATHY: ICD-10-CM

## 2025-06-06 PROCEDURE — 72146 MRI CHEST SPINE W/O DYE: CPT

## 2025-08-24 DIAGNOSIS — R09.81 NASAL CONGESTION: ICD-10-CM

## 2025-08-25 RX ORDER — AZELASTINE 1 MG/ML
SPRAY, METERED NASAL
Qty: 60 ML | Refills: 1 | Status: SHIPPED | OUTPATIENT
Start: 2025-08-25

## (undated) DEVICE — SURE SET-DOUBLE BASIN-LF: Brand: MEDLINE INDUSTRIES, INC.

## (undated) DEVICE — MEDI-VAC NON-CONDUCTIVE SUCTION TUBING: Brand: CARDINAL HEALTH

## (undated) DEVICE — COVER,TABLE,44X90,STERILE: Brand: MEDLINE

## (undated) DEVICE — COVER,MAYO STAND,XL,STERILE: Brand: MEDLINE

## (undated) DEVICE — LAMINECTOMY PK

## (undated) DEVICE — ALCOHOL RUBBING 16OZ 70% ISO

## (undated) DEVICE — STERILE POLYISOPRENE POWDER-FREE SURGICAL GLOVES: Brand: PROTEXIS

## (undated) DEVICE — TOWEL OR BLUEE 16X26IN ST 8 PACK ORB08 16X26ORTWL

## (undated) DEVICE — GLOVE SURG SZ 85 L12IN FNGR ORTHO 126MIL CRM LTX FREE

## (undated) DEVICE — CATHETER IV 14GA L5.25IN PERIPH ORNG FEP POLYMER 3 BVL

## (undated) DEVICE — GOWN,SIRUS,POLYRNF,BRTHSLV,LG,30/CS: Brand: MEDLINE

## (undated) DEVICE — APPLICATOR PREP 26ML 0.7% IOD POVACRYLEX 74% ISO ALC ST

## (undated) DEVICE — GAUZE,SPONGE,4"X4",16PLY,STRL,LF,10/TRAY: Brand: MEDLINE

## (undated) DEVICE — SOLUTION IV 1000ML 0.9% SOD CHL

## (undated) DEVICE — SHEET,T,THYROID,STERILE: Brand: MEDLINE

## (undated) DEVICE — APPLICATOR MEDICATED 26 CC SOLUTION HI LT ORNG CHLORAPREP

## (undated) DEVICE — AGENT HEMSTAT 8ML FLX TIP MTRX + DISP SURGIFLO

## (undated) DEVICE — BLANKET WRM W40.2XL55.9IN IORT LO BODY + MISTRAL AIR

## (undated) DEVICE — ADHESIVE SKIN CLSR 0.7ML TOP DERMBND ADV

## (undated) DEVICE — STAPLER SKIN H3.9MM WIRE DIA0.58MM CRWN 6.9MM 35 STPL ROT

## (undated) DEVICE — UNDERGLOVE SURG SZ 8.5 FNGR THK0.21MIL GRN LTX BEAD CUF

## (undated) DEVICE — DRILL BIT 7080510 11 MM DRILL BIT S

## (undated) DEVICE — CUFF RESTRN WRST OR ANK 45FT AD FOAM

## (undated) DEVICE — UNIVERSAL BLOCK TRAY: Brand: MEDLINE INDUSTRIES, INC.

## (undated) DEVICE — SUTURE VCRL SZ 3-0 L18IN ABSRB UD L26MM SH 1/2 CIR J864D

## (undated) DEVICE — JEWISH HOSPITAL TURNOVER KIT: Brand: MEDLINE INDUSTRIES, INC.

## (undated) DEVICE — COVER,TABLE,HEAVY DUTY,77"X90",STRL: Brand: MEDLINE

## (undated) DEVICE — BLADE MYR OFFSET 45DEG SPEAR TIP NAR SHFT W/ RND KNURLED

## (undated) DEVICE — SUTURE PERMAHAND SZ 2-0 L12X18IN NONABSORBABLE BLK SILK A185H

## (undated) DEVICE — DRAPE MICSCP W132XL406CM LENS DIA68MM W VARI LENS2 FOR LEICA

## (undated) DEVICE — STERILE COTTON BALLS LARGE 5/P: Brand: MEDLINE

## (undated) DEVICE — PROTECTOR ULN NRV PUR FOAM HK LOOP STRP ANATOMICALLY

## (undated) DEVICE — TOWEL,OR,DSP,ST,BLUE,STD,6/PK,12PK/CS: Brand: MEDLINE

## (undated) DEVICE — TZ MEDICAL TITANIUM DISTRACTION PINS 14MM: Brand: TZ MEDICAL TITANIUM DISTRACTION PINS

## (undated) DEVICE — CODMAN® SURGICAL PATTIES 1/2" X 1" (1.27CM X 2.54CM): Brand: CODMAN®

## (undated) DEVICE — GARMENT,MEDLINE,DVT,INT,CALF,MED, GEN2: Brand: MEDLINE

## (undated) DEVICE — SPONGE,PEANUT,XRAY,ST,SM,3/8",5/CARD: Brand: MEDLINE INDUSTRIES, INC.

## (undated) DEVICE — CABLE BPLR L12FT FLYING LD DISPOSABLE

## (undated) DEVICE — TOOL 14MH30 LEGEND 14CM 3MM: Brand: MIDAS REX ™

## (undated) DEVICE — PLATE ES AD W 9FT CRD 2

## (undated) DEVICE — SYRINGE IRRIG 60ML SFT PLIABLE BLB EZ TO GRP 1 HND USE W/

## (undated) DEVICE — SSC BONE WAX: Brand: SSC BONE WAX

## (undated) DEVICE — GAUZE,SPONGE,4"X4",16PLY,XRAY,STRL,LF: Brand: MEDLINE